# Patient Record
Sex: MALE | Race: WHITE | Employment: FULL TIME | URBAN - METROPOLITAN AREA
[De-identification: names, ages, dates, MRNs, and addresses within clinical notes are randomized per-mention and may not be internally consistent; named-entity substitution may affect disease eponyms.]

---

## 2020-02-11 ENCOUNTER — OFFICE VISIT (OUTPATIENT)
Dept: FAMILY MEDICINE CLINIC | Age: 40
End: 2020-02-11

## 2020-02-11 ENCOUNTER — HOSPITAL ENCOUNTER (OUTPATIENT)
Dept: LAB | Age: 40
Discharge: HOME | End: 2020-02-11
Payer: COMMERCIAL

## 2020-02-11 VITALS
DIASTOLIC BLOOD PRESSURE: 91 MMHG | RESPIRATION RATE: 16 BRPM | OXYGEN SATURATION: 96 % | HEART RATE: 95 BPM | SYSTOLIC BLOOD PRESSURE: 145 MMHG | WEIGHT: 209.8 LBS | TEMPERATURE: 98.4 F | HEIGHT: 72 IN | BODY MASS INDEX: 28.42 KG/M2

## 2020-02-11 DIAGNOSIS — G89.29 CHRONIC PAIN OF BOTH HIPS: ICD-10-CM

## 2020-02-11 DIAGNOSIS — M87.00 AVASCULAR NECROSIS (HCC): ICD-10-CM

## 2020-02-11 DIAGNOSIS — M10.00 ACUTE IDIOPATHIC GOUT, UNSPECIFIED SITE: ICD-10-CM

## 2020-02-11 DIAGNOSIS — N52.01 ERECTILE DYSFUNCTION DUE TO ARTERIAL INSUFFICIENCY: ICD-10-CM

## 2020-02-11 DIAGNOSIS — M25.551 CHRONIC PAIN OF BOTH HIPS: ICD-10-CM

## 2020-02-11 DIAGNOSIS — M25.552 CHRONIC PAIN OF BOTH HIPS: ICD-10-CM

## 2020-02-11 DIAGNOSIS — M10.00 ACUTE IDIOPATHIC GOUT, UNSPECIFIED SITE: Primary | ICD-10-CM

## 2020-02-11 LAB — URATE SERPL-MCNC: 7.5 MG/DL (ref 2.6–7.2)

## 2020-02-11 PROCEDURE — 36415 COLL VENOUS BLD VENIPUNCTURE: CPT

## 2020-02-11 PROCEDURE — 84550 ASSAY OF BLOOD/URIC ACID: CPT

## 2020-02-11 RX ORDER — SILDENAFIL 100 MG/1
100 TABLET, FILM COATED ORAL AS NEEDED
Qty: 30 TAB | Refills: 5 | Status: SHIPPED | OUTPATIENT
Start: 2020-02-11 | End: 2020-10-03

## 2020-02-11 RX ORDER — ALLOPURINOL 100 MG/1
100 TABLET ORAL DAILY
Qty: 90 TAB | Refills: 1 | Status: SHIPPED | OUTPATIENT
Start: 2020-02-11 | End: 2020-07-12

## 2020-02-11 RX ORDER — VENLAFAXINE HYDROCHLORIDE 150 MG/1
150 CAPSULE, EXTENDED RELEASE ORAL DAILY
COMMUNITY
Start: 2020-01-23

## 2020-02-11 RX ORDER — ALLOPURINOL 100 MG/1
TABLET ORAL DAILY
COMMUNITY
End: 2020-02-11 | Stop reason: SDUPTHER

## 2020-02-11 RX ORDER — SILDENAFIL 100 MG/1
100 TABLET, FILM COATED ORAL AS NEEDED
Qty: 9 TAB | Refills: 5 | Status: SHIPPED | OUTPATIENT
Start: 2020-02-11 | End: 2020-02-11 | Stop reason: SDUPTHER

## 2020-02-11 NOTE — PROGRESS NOTES
HISTORY OF PRESENT ILLNESS  Brennen Malave is a 44 y.o. male. Patient presents to [facility] care. HPI  Pt has avascular necrosis which was diagnosed 2015  He did not use steroids. Pt has questions regarding ED and treatment options. Pt is able to get an erection but not maintain it. Pt ran out of gout medication about a month ago. Review of Systems   Constitutional: Negative. Respiratory: Negative. Cardiovascular: Negative. Musculoskeletal: Positive for joint pain (esha hip). Visit Vitals  BP (!) 145/91 (BP 1 Location: Left arm)   Pulse 95   Temp 98.4 °F (36.9 °C) (Oral)   Resp 16   Ht 6' (1.829 m)   Wt 209 lb 12.8 oz (95.2 kg)   SpO2 96%   BMI 28.45 kg/m²       Physical Exam  Constitutional:       General: He is not in acute distress. Appearance: Normal appearance. He is not ill-appearing. Cardiovascular:      Rate and Rhythm: Normal rate and regular rhythm. Heart sounds: No murmur. Pulmonary:      Effort: Pulmonary effort is normal. No respiratory distress. Breath sounds: Normal breath sounds. No stridor. No wheezing, rhonchi or rales. Neurological:      Mental Status: He is alert and oriented to person, place, and time. ASSESSMENT and PLAN    ICD-10-CM ICD-9-CM    1. Acute idiopathic gout, unspecified site M10.00 274.01 allopurinoL (ZYLOPRIM) 100 mg tablet      URIC ACID   2. Erectile dysfunction due to arterial insufficiency N52.01 607.84 sildenafil citrate (VIAGRA) 100 mg tablet      DISCONTINUED: sildenafil citrate (VIAGRA) 100 mg tablet   3. Avascular necrosis ([address] M87.00 733.40 REFERRAL TO ORTHOPEDICS   4. Chronic pain of both hips M25.551 719.45     M25.552 338.29     G89.29       PLAN:  We discussed his history. Pt referred to ortho for his avascular necrosis. I have discussed the diagnosis with the patient and the intended plan as seen in the above orders.   The patient has received an after-visit summary and questions were answered concerning future plans.  I have discussed medication side effects and warnings with the patient as well. Patient will call for further questions. Follow-up and Dispositions    · Return in about 3 months (around 5/11/2020) for chronic care.

## 2020-02-11 NOTE — PROGRESS NOTES
Pt is here for establish care, discuss gout, possible ED    1. Have you been to the ER, urgent care clinic since your last visit? Hospitalized since your last visit? No    2. Have you seen or consulted any other health care providers outside of the [address] [place] [address] since your last visit? Include any pap smears or colon screening.  Yes Rica Du 10/19

## 2020-02-12 NOTE — PROGRESS NOTES
Please advise Pt that his gout level is elevated but since he has been out of his medication for a bit, I would like him to continue that dose. I would like to recheck this after he has been on the medication for several months.

## 2020-03-13 ENCOUNTER — OFFICE VISIT (OUTPATIENT)
Dept: ORTHOPEDIC SURGERY | Age: 40
End: 2020-03-13

## 2020-03-13 VITALS
HEIGHT: 72 IN | HEART RATE: 83 BPM | SYSTOLIC BLOOD PRESSURE: 145 MMHG | WEIGHT: 209 LBS | DIASTOLIC BLOOD PRESSURE: 90 MMHG | BODY MASS INDEX: 28.31 KG/M2 | OXYGEN SATURATION: 98 %

## 2020-03-13 DIAGNOSIS — M25.551 BILATERAL HIP PAIN: Primary | ICD-10-CM

## 2020-03-13 DIAGNOSIS — Z96.643 HISTORY OF BILATERAL HIP REPLACEMENTS: ICD-10-CM

## 2020-03-13 DIAGNOSIS — M25.552 BILATERAL HIP PAIN: Primary | ICD-10-CM

## 2020-03-13 NOTE — PROGRESS NOTES
Patient: Carter King               MRN: [id number]   SSN: [id number]  YOB: 1980       AGE: 44 y.o. SEX: male  Body mass index is Body mass index is 28.35 kg/m². PCP: Carrie Bonilla NP  03/13/20      HISTORY OF PRESENT ILLNESS:  I had the pleasure of viewing Mr. Shelly Walls in office today in regards to his bilateral hip replacements. 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 Patient has a history of DVT. He had both of his hips replaced 5 years ago, he had avascular necrosis. He reports that it took a while to recover from surgery, but once he got moving he was satisfied with the results of the surgery. He still has trouble doing lots of steps, he has to use the elevator at work. He reports an aching pain in the groin, left side is worse than the right. REVIEW OF SYSTEMS:  Twelve point review of systems performed today. Pertinent positives noted, all other systems reviewed negative. PHYSICAL EXAMINATION:  On examination today, patient walks well. Both hips rotate well and are not painful with rotation. Hip abductor and hip flexor strength are good. Calf is non-tender, both feet are warm and well perfused. No cyanosis, peripheral edema, or clubbing. RADIOGRAPHS:  AP pelvis and AP and lateral x-rays of both hips taken in our [facility] office today, 3/13/2020 reveal a [address] implant on the right hip that appears to be well fixed and well aligned, with relatively neutral anteversion. Left hip is also a summit that appears to be well fixed, the cup is appears to be somewhat lateralized and slightly closed. IMPRESSION:  Mr. Josi Velazquez hip replacements are doing okay, I do not think surgery is indicated. The cup in the left hip is slightly closed. I think he may be getting a mild impingement, which produces mild pain when he is sitting. I advised him to avoid sitting crossed-legged.  Again I do not think that an operation is necessary at this time, both implants are well osseous integrated and overall are doing very well for him. It is a pleasure to share in his care, we will see him back in follow up in two years, sooner if necessary. REVIEW OF SYSTEMS  Review of Systems   Constitutional: Negative. HENT: Negative. Eyes: Negative. Respiratory: Negative. Cardiovascular: Negative. Gastrointestinal: Negative. Genitourinary: Negative. Musculoskeletal: Positive for joint pain. Skin: Negative. Neurological: Negative. Endo/Heme/Allergies: Negative. Psychiatric/Behavioral: Negative. MEDICAL HISTORY  Past Medical History:   Diagnosis Date    Anxiety     Psychiatry Lois Mahoney Arthritis     Avascular necrosis ([facility] [address] [address]     Raymond Kim    [address] [place] [address] Depression     Psychiatry Lois Mahoney Gout     Hip pain, chronic     [facility] Dr. Jeaneth Kim     Tobacco abuse        SURGICAL HISTORY  Past Surgical History:   Procedure Laterality Date    HX HIP REPLACEMENT Left 07/22/2015    [facility] Dr. Vianey Teran Right 11/05/2015    [facility] Dr[facility] Jean[facility] Kim     HX WISDOM TEETH EXTRACTION         CURRENT MEDICATIONS  Current Outpatient Medications   Medication Sig Dispense Refill    venlafaxine-SR (EFFEXOR-XR) 150 mg capsule Take 150 mg by mouth daily.  pyridoxine HCl, vitamin B6, (VITAMIN B-6 PO) Take 500 mg by mouth daily.  allopurinoL (ZYLOPRIM) 100 mg tablet Take 1 Tab by mouth daily. 90 Tab 1    sildenafil citrate (VIAGRA) 100 mg tablet Take 1 Tab by mouth as needed for Erectile Dysfunction. 30 Tab 5    clonazePAM (KLONOPIN) 2 mg tablet Take 2 mg by mouth three (3) times daily. Indications: PANIC DISORDER, QTY #90, Written on 8/18/16, Filled on 10/7/16. Prescriber Psychiatry Eileen Renee         ALLERGIES  No Known Allergies    FAMILHY HISTORY  [id number]      SOCIAL HISTORY  Social History     Socioeconomic History    Marital status:      Spouse name: Not on file    Number of children: Not on file    Years of education: Not on file    Highest education level: Not on file   Tobacco Use    Smoking status: Current Every Day Smoker     Packs/day: 0.50     Years: 20.00     Pack years: 10.00     Types: Cigarettes    Smokeless tobacco: Never Used   Substance and Sexual Activity    Alcohol use: Yes     Comment: 1 case of beer a week    Drug use: No    Sexual activity: Yes     Partners: Female     Birth control/protection: None       VISIT VITALS  Visit Vitals  /90   Pulse 83   Ht 6' (1.829 m)   Wt 209 lb (94.8 kg)   SpO2 98%   BMI 28.35 kg/m²       Pain Assessment  3/13/2020   Location of Pain Hip   Location Modifiers Right;Left   Severity of Pain 0         Written by [employer] as dictated by Garrison Quigley MD.

## 2020-05-13 ENCOUNTER — VIRTUAL VISIT (OUTPATIENT)
Dept: FAMILY MEDICINE CLINIC | Age: 40
End: 2020-05-13

## 2020-05-13 DIAGNOSIS — R73.03 PREDIABETES: ICD-10-CM

## 2020-05-13 DIAGNOSIS — M10.00 ACUTE IDIOPATHIC GOUT, UNSPECIFIED SITE: Primary | ICD-10-CM

## 2020-05-13 DIAGNOSIS — Z12.5 SCREENING FOR PROSTATE CANCER: ICD-10-CM

## 2020-05-13 DIAGNOSIS — Z13.220 SCREENING FOR HYPERLIPIDEMIA: ICD-10-CM

## 2020-05-13 NOTE — PROGRESS NOTES
Marilynn Lozano is a 44 y.o. male who was seen by synchronous (real-time) audio-video technology on 5/13/2020. Consent: Marilynn Lozano, who was seen by synchronous (real-time) audio-video technology, and/or his healthcare decision maker, is aware that this patient-initiated, Telehealth encounter on 5/13/2020 is a billable service, with coverage as determined by his insurance carrier. He is aware that he may receive a bill and has provided verbal consent to proceed: Yes. I am working from home. Patient is in his car at work. Subjective:   Marilynn Lozano is a 44 y.o. male who was seen for gout    Pt states he is doing well with his gout since starting the medication. He would like to have his HgbA1C done since at one point someone told him he was prediabetic. Prior to Admission medications    Medication Sig Start Date End Date Taking? Authorizing Provider   venlafaxine-SR [facility]) 150 mg capsule Take 150 mg by mouth daily. 1/23/20   Provider, Historical   pyridoxine HCl, vitamin B6, (VITAMIN B-6 PO) Take 500 mg by mouth daily. Provider, Historical   allopurinoL (ZYLOPRIM) 100 mg tablet Take 1 Tab by mouth daily. 2/11/20   Mary Steve, ELIZABETH   sildenafil citrate (VIAGRA) 100 mg tablet Take 1 Tab by mouth as needed for Erectile Dysfunction. 2/11/20   Mary Steve, ELIZABETH   clonazePAM [place] [facility]) 2 mg tablet Take 2 mg by mouth three (3) times daily. Indications: PANIC DISORDER, QTY #90, Written on 8/18/16, Filled on 10/7/16. Prescriber Psychiatry [facility] 50 Jessica Szymanski NP     No Known Allergies    Patient Active Problem List    Diagnosis Date Noted    Depression     Anxiety     Hip pain, chronic     Avascular necrosis (HCC)     Tobacco abuse     Poison sumac 09/06/2011     Current Outpatient Medications   Medication Sig Dispense Refill    venlafaxine-SR (EFFEXOR-XR) 150 mg capsule Take 150 mg by mouth daily.       pyridoxine HCl, vitamin B6, (VITAMIN B-6 PO) Take 500 mg by mouth daily.  allopurinoL (ZYLOPRIM) 100 mg tablet Take 1 Tab by mouth daily. 90 Tab 1    sildenafil citrate (VIAGRA) 100 mg tablet Take 1 Tab by mouth as needed for Erectile Dysfunction. 30 Tab 5    clonazePAM (KLONOPIN) 2 mg tablet Take 2 mg by mouth three (3) times daily. Indications: PANIC DISORDER, QTY #90, Written on 8/18/16, Filled on 10/7/16. Prescriber Psychiatry Eileen Renee       No Known Allergies  Past Medical History:   Diagnosis Date    Anxiety     Psychiatry Lois Buckley Arthritis     Avascular necrosis ([address]     Nitish Raheel Dr. Tala Mejia.Peri Depression     Psychiatry Lois Buckley Gout     Hip pain, chronic     [facility] Dr. Tala Hollis     Tobacco abuse      Past Surgical History:   Procedure Laterality Date    HX HIP REPLACEMENT Left 07/22/2015    [facility] Dr[facility] Tala Hollis     HX HIP REPLACEMENT Right 11/05/2015    [facility] Dr[facility] Tala Hollis     HX WISDOM TEETH EXTRACTION       Family History   Problem Relation Age of Onset    Anxiety Mother     Heart Attack Father     Diabetes Brother     Heart Disease Paternal Grandmother     Diabetes Paternal Grandmother     Heart Disease Paternal Grandfather      Social History     Tobacco Use    Smoking status: Current Every Day Smoker     Packs/day: 0.50     Years: 20.00     Pack years: 10.00     Types: Cigarettes    Smokeless tobacco: Never Used   Substance Use Topics    Alcohol use: Yes     Comment: 1 case of beer a week       Review of Systems   Constitutional: Negative. Respiratory: Negative. Cardiovascular: Negative. Genitourinary: Negative. Objective:   Vital Signs: (As obtained by patient/caregiver at home)  There were no vitals taken for this visit.      [INSTRUCTIONS:  \"[x]\" Indicates a positive item  \"[]\" Indicates a negative item  -- DELETE ALL ITEMS NOT EXAMINED]    Constitutional: [x] Appears well-developed and well-nourished [x] No apparent distress        Mental status: [x] Alert and awake  [x] Oriented to person/place/time [x] Able to follow commands        Eyes:   EOM    [x]  Normal      Sclera  [x]  Normal              Discharge [x]  None visible       HENT: [x] Normocephalic, atraumatic        Pulmonary/Chest: [x] Respiratory effort normal   [x] No visualized signs of difficulty breathing or respiratory distress                  [x] Normal range of motion of neck      Neurological:        [x] No Facial Asymmetry (Cranial nerve 7 motor function) (limited exam due to video visit)       -            Psychiatric:       [x] Normal Affect     Diagnoses and all orders for this visit:    Acute idiopathic gout, unspecified site  -     URIC ACID; Future    Screening for prostate cancer  -     PSA SCREENING (SCREENING); Future    Prediabetes  -     METABOLIC PANEL, COMPREHENSIVE; Future  -     HEMOGLOBIN A1C WITH EAG; Future    Screening for hyperlipidemia  -     LIPID PANEL; Future      PLAN:  Pt will call for refills when he needs them. Pt will continue to work on his diet and exercise. I have discussed the diagnosis with the patient and the intended plan as seen in the above orders. The patient has received an after-visit summary and questions were answered concerning future plans. I have discussed medication side effects and warnings with the patient as well. Patient will call for further questions. Follow-up and Dispositions    · Return in about 6 months (around 11/13/2020) for chronic care. We discussed the expected course, resolution and complications of the diagnosis(es) in detail. Medication risks, benefits, costs, interactions, and alternatives were discussed as indicated. I advised him to contact the office if his condition worsens, changes or fails to improve as anticipated. He expressed understanding with the diagnosis(es) and plan.        Cris French is a 44 y.o. male who was evaluated by a video visit encounter for concerns as above. Patient identification was verified prior to start of the visit. A caregiver was present when appropriate. Due to this being a TeleHealth encounter (During UJQNX-86 public health emergency), evaluation of the following organ systems was limited: Vitals/Constitutional/EENT/Resp/CV/GI//MS/Neuro/Skin/Heme-Lymph-Imm. Pursuant to the emergency declaration under the [facility], [facility] waiver authority and the [employer] and [employer] Act, this Virtual  Visit was conducted, with patient's (and/or legal guardian's) consent, to reduce the patient's risk of exposure to COVID-19 and provide necessary medical care. Services were provided through a video synchronous discussion virtually to substitute for in-person clinic visit. Patient and provider were located at their individual homes.       Kory Garcia NP

## 2020-05-27 ENCOUNTER — HOSPITAL ENCOUNTER (OUTPATIENT)
Dept: LAB | Age: 40
Discharge: HOME | End: 2020-05-27
Payer: COMMERCIAL

## 2020-05-27 DIAGNOSIS — Z12.5 SCREENING FOR PROSTATE CANCER: ICD-10-CM

## 2020-05-27 DIAGNOSIS — M10.00 ACUTE IDIOPATHIC GOUT, UNSPECIFIED SITE: ICD-10-CM

## 2020-05-27 DIAGNOSIS — Z13.220 SCREENING FOR HYPERLIPIDEMIA: ICD-10-CM

## 2020-05-27 DIAGNOSIS — R73.03 PREDIABETES: ICD-10-CM

## 2020-05-27 LAB
ALBUMIN SERPL-MCNC: 4 G/DL (ref 3.4–5)
ALBUMIN/GLOB SERPL: 1.1 {RATIO} (ref 0.8–1.7)
ALP SERPL-CCNC: 92 U/L (ref 45–117)
ALT SERPL-CCNC: 41 U/L (ref 16–61)
ANION GAP SERPL CALC-SCNC: 4 MMOL/L (ref 3–18)
AST SERPL-CCNC: 30 U/L (ref 10–38)
BILIRUB SERPL-MCNC: 0.5 MG/DL (ref 0.2–1)
BUN SERPL-MCNC: 15 MG/DL (ref 7–18)
BUN/CREAT SERPL: 18 (ref 12–20)
CALCIUM SERPL-MCNC: 9.1 MG/DL (ref 8.5–10.1)
CHLORIDE SERPL-SCNC: 107 MMOL/L (ref 100–111)
CHOLEST SERPL-MCNC: 205 MG/DL
CO2 SERPL-SCNC: 26 MMOL/L (ref 21–32)
CREAT SERPL-MCNC: 0.84 MG/DL (ref 0.6–1.3)
EST. AVERAGE GLUCOSE BLD GHB EST-MCNC: 111 MG/DL
GLOBULIN SER CALC-MCNC: 3.7 G/DL (ref 2–4)
GLUCOSE SERPL-MCNC: 80 MG/DL (ref 74–99)
HBA1C MFR BLD: 5.5 % (ref 4.2–5.6)
HDLC SERPL-MCNC: 62 MG/DL (ref 40–60)
HDLC SERPL: 3.3 {RATIO} (ref 0–5)
LDLC SERPL CALC-MCNC: 120.2 MG/DL (ref 0–100)
LIPID PROFILE,FLP: ABNORMAL
POTASSIUM SERPL-SCNC: 4.2 MMOL/L (ref 3.5–5.5)
PROT SERPL-MCNC: 7.7 G/DL (ref 6.4–8.2)
PSA SERPL-MCNC: 1.7 NG/ML (ref 0–4)
SODIUM SERPL-SCNC: 137 MMOL/L (ref 136–145)
TRIGL SERPL-MCNC: 114 MG/DL (ref ?–150)
URATE SERPL-MCNC: 5.5 MG/DL (ref 2.6–7.2)
VLDLC SERPL CALC-MCNC: 22.8 MG/DL

## 2020-05-27 PROCEDURE — 80061 LIPID PANEL: CPT

## 2020-05-27 PROCEDURE — 80053 COMPREHEN METABOLIC PANEL: CPT

## 2020-05-27 PROCEDURE — 84153 ASSAY OF PSA TOTAL: CPT

## 2020-05-27 PROCEDURE — 84550 ASSAY OF BLOOD/URIC ACID: CPT

## 2020-05-27 PROCEDURE — 83036 HEMOGLOBIN GLYCOSYLATED A1C: CPT

## 2020-05-27 PROCEDURE — 36415 COLL VENOUS BLD VENIPUNCTURE: CPT

## 2020-08-26 ENCOUNTER — HOSPITAL ENCOUNTER (OUTPATIENT)
Dept: LAB | Age: 40
Discharge: HOME | End: 2020-08-26
Payer: COMMERCIAL

## 2020-08-26 ENCOUNTER — VIRTUAL VISIT (OUTPATIENT)
Dept: FAMILY MEDICINE CLINIC | Age: 40
End: 2020-08-26

## 2020-08-26 DIAGNOSIS — M25.562 ACUTE PAIN OF LEFT KNEE: ICD-10-CM

## 2020-08-26 DIAGNOSIS — M10.00 ACUTE IDIOPATHIC GOUT, UNSPECIFIED SITE: ICD-10-CM

## 2020-08-26 DIAGNOSIS — M25.562 ACUTE PAIN OF LEFT KNEE: Primary | ICD-10-CM

## 2020-08-26 LAB — URATE SERPL-MCNC: 3.2 MG/DL (ref 2.6–7.2)

## 2020-08-26 PROCEDURE — 36415 COLL VENOUS BLD VENIPUNCTURE: CPT

## 2020-08-26 PROCEDURE — 84550 ASSAY OF BLOOD/URIC ACID: CPT

## 2020-08-26 RX ORDER — PREDNISONE 10 MG/1
TABLET ORAL
Qty: 33 TAB | Refills: 0 | Status: SHIPPED | OUTPATIENT
Start: 2020-08-26 | End: 2020-08-26 | Stop reason: SDUPTHER

## 2020-08-26 RX ORDER — PREDNISONE 10 MG/1
TABLET ORAL
Qty: 33 TAB | Refills: 0 | Status: SHIPPED | OUTPATIENT
Start: 2020-08-26 | End: 2020-12-23 | Stop reason: ALTCHOICE

## 2020-08-26 RX ORDER — HYDROCODONE BITARTRATE AND ACETAMINOPHEN 5; 300 MG/1; MG/1
1 TABLET ORAL
Qty: 30 TAB | Refills: 0 | Status: SHIPPED | OUTPATIENT
Start: 2020-08-26 | End: 2020-08-31

## 2020-08-26 NOTE — PROGRESS NOTES
Donald Lundberg is a 36 y.o. male who was seen by synchronous (real-time) audio-video technology on 8/26/2020 for left knee pain    I am working from home. Patient is at his work      Subjective:   Pt woke up on 8- with left knee pain. He denies injury. By Tuesday 8-, it hurt so bad that he went to Urgent Care. He was given Etolac and had x-rays which were normal. The NSAID has not helped at all. It does not feel like his gout that he gets in his toe. Prior to Admission medications    Medication Sig Start Date End Date Taking? Authorizing Provider   allopurinoL (ZYLOPRIM) 100 mg tablet TAKE ONE TABLET BY MOUTH DAILY 7/12/20   Honey Steve NP   venlafaxine-SR [facility]) 150 mg capsule Take 150 mg by mouth daily. 1/23/20   Provider, Historical   pyridoxine HCl, vitamin B6, (VITAMIN B-6 PO) Take 500 mg by mouth daily. Provider, Historical   sildenafil citrate (VIAGRA) 100 mg tablet Take 1 Tab by mouth as needed for Erectile Dysfunction. 2/11/20   Honey Steve NP   clonazePAM [place]) 2 mg tablet Take 2 mg by mouth three (3) times daily. Indications: PANIC DISORDER, QTY #90, Written on 8/18/16, Filled on 10/7/16. Prescriber Psychiatry Eileen 50 Alli Lewis NP     Patient Active Problem List    Diagnosis Date Noted    Depression     Anxiety     Hip pain, chronic     Avascular necrosis (HCC)     Tobacco abuse     Poison sumac 09/06/2011     Current Outpatient Medications   Medication Sig Dispense Refill    predniSONE (DELTASONE) 10 mg tablet Day 1: 6 tablets Day 2: 5 tablets Day 3: 4 tablets Days 4,5,6: 3 tablets Days 7,8,9: 2 tablets Days 10,11,12: 1 tablet. 33 Tab 0    HYDROcodone-acetaminophen (XODOL) 5-300 mg tablet Take 1 Tab by mouth every four (4) hours as needed for Pain for up to 5 days. Max Daily Amount: 6 Tabs.  30 Tab 0    allopurinoL (ZYLOPRIM) 100 mg tablet TAKE ONE TABLET BY MOUTH DAILY 90 Tab 1    venlafaxine-SR (EFFEXOR-XR) 150 mg capsule Take 150 mg by mouth daily.  pyridoxine HCl, vitamin B6, (VITAMIN B-6 PO) Take 500 mg by mouth daily.  sildenafil citrate (VIAGRA) 100 mg tablet Take 1 Tab by mouth as needed for Erectile Dysfunction. 30 Tab 5    clonazePAM (KLONOPIN) 2 mg tablet Take 2 mg by mouth three (3) times daily. Indications: PANIC DISORDER, QTY #90, Written on 8/18/16, Filled on 10/7/16. Prescriber Psychiatry Eileen Renee       No Known Allergies  Past Medical History:   Diagnosis Date    Anxiety     Psychiatry Lois Estrella Maggi Arthritis     [address] necrosis [address] [address]     Nela Sammiehayden Henning    [place] Depression     Psychiatry Lois Tay Gout     Hip pain, chronic     [facility] Dr[facility] Suha Henning     Tobacco abuse      Past Surgical History:   Procedure Laterality Date    HX HIP REPLACEMENT Left 07/22/2015    [facility] Su[facility] Henning     HX HIP REPLACEMENT Right 11/05/2015    [facility] Su[facility] Henning     HX WISDOM TEETH EXTRACTION       Family History   Problem Relation Age of Onset    Anxiety Mother     Heart Attack Father     Diabetes Brother     Heart Disease Paternal Grandmother     Diabetes Paternal Grandmother     Heart Disease Paternal Grandfather      Social History     Tobacco Use    Smoking status: Current Every Day Smoker     Packs/day: 0.50     Years: 20.00     Pack years: 10.00     Types: Cigarettes    Smokeless tobacco: Never Used   Substance Use Topics    Alcohol use: Yes     Comment: 1 case of beer a week       Review of Systems   Constitutional: Negative. HENT: Negative. Respiratory: Negative. Cardiovascular: Negative. Musculoskeletal: Positive for joint pain (left knee pain). Objective:   No flowsheet data found.      [INSTRUCTIONS:  \"[x]\" Indicates a positive item  \"[]\" Indicates a negative item  -- DELETE ALL ITEMS NOT EXAMINED]    Constitutional: [x] Appears well-developed and well-nourished [x] No apparent distress          Mental status: [x] Alert and awake  [x] Oriented to person/place/time [x] Able to follow commands         Eyes:   EOM    [x]  Normal      Sclera  [x]  Normal              Discharge [x]  None visible        HENT: [x] Normocephalic, atraumatic        Pulmonary/Chest: [x] Respiratory effort normal   [x] No visualized signs of difficulty breathing or respiratory distress          Musculoskeletal:           [x] Normal range of motion of neck            Neurological:        [x] No Facial Asymmetry (Cranial nerve 7 motor function) (limited exam due to video visit)          [x] No gaze palsy                    Psychiatric:       [x] Normal Affect        [x] No Hallucinations    Diagnoses and all orders for this visit:    Acute pain of left knee  -     predniSONE (DELTASONE) 10 mg tablet; Day 1: 6 tablets Day 2: 5 tablets Day 3: 4 tablets Days 4,5,6: 3 tablets Days 7,8,9: 2 tablets Days 10,11,12: 1 tablet., Normal, Disp-33 Tab,R-0  -     HYDROcodone-acetaminophen (XODOL) 5-300 mg tablet; Take 1 Tab by mouth every four (4) hours as needed for Pain for up to 5 days. Max Daily Amount: 6 Tabs., Normal, Disp-30 Tab,R-0  -     URIC ACID; Future    Acute idiopathic gout, unspecified site  -     URIC ACID; Future      PLAN:  If uric acid levels are normal, the next step is a MRI. Pt was advised to take the Vicodin only at night. Pt advised to take Prednisone with food and in the am.    I have discussed the diagnosis with the patient and the intended plan as seen in the above orders. The patient has received an after-visit summary and questions were answered concerning future plans. I have discussed medication side effects and warnings with the patient as well. Patient will call for further questions. Follow-up and Dispositions    · Return if symptoms worsen or fail to improve. We discussed the expected course, resolution and complications of the diagnosis(es) in detail.   Medication risks, benefits, costs, interactions, and alternatives were discussed as indicated. I advised him to contact the office if his condition worsens, changes or fails to improve as anticipated. He expressed understanding with the diagnosis(es) and plan. Sally Hackett, who was evaluated through a patient-initiated, synchronous (real-time) audio-video encounter, and/or his healthcare decision maker, is aware that it is a billable service, with coverage as determined by his insurance carrier. He provided verbal consent to proceed: Yes, and patient identification was verified. It was conducted pursuant to the emergency declaration under the [facility], [address] [place] [address] authority and the [employer] and [employer] Act. A caregiver was present when appropriate. Ability to conduct physical exam was limited. I was at home. The patient was at home.       Aniket Edmonds NP

## 2020-08-28 DIAGNOSIS — M25.562 ACUTE PAIN OF LEFT KNEE: Primary | ICD-10-CM

## 2020-09-08 ENCOUNTER — HOSPITAL ENCOUNTER (OUTPATIENT)
Dept: MRI IMAGING | Age: 40
Discharge: HOME | End: 2020-09-08
Attending: NURSE PRACTITIONER
Payer: COMMERCIAL

## 2020-09-08 DIAGNOSIS — M25.562 ACUTE PAIN OF LEFT KNEE: ICD-10-CM

## 2020-09-08 DIAGNOSIS — R93.89 ABNORMAL FINDING OF DIAGNOSTIC IMAGING: ICD-10-CM

## 2020-09-08 DIAGNOSIS — M25.562 ACUTE PAIN OF LEFT KNEE: Primary | ICD-10-CM

## 2020-09-08 PROCEDURE — 73721 MRI JNT OF LWR EXTRE W/O DYE: CPT

## 2020-09-08 NOTE — PROGRESS NOTES
Please advise Patient that the MRI showed a stress fracture proximal medial tibia. I place a referral to ortho for him.

## 2020-09-09 ENCOUNTER — OFFICE VISIT (OUTPATIENT)
Dept: ORTHOPEDIC SURGERY | Age: 40
End: 2020-09-09

## 2020-09-09 ENCOUNTER — VIRTUAL VISIT (OUTPATIENT)
Dept: FAMILY MEDICINE CLINIC | Age: 40
End: 2020-09-09

## 2020-09-09 VITALS
SYSTOLIC BLOOD PRESSURE: 146 MMHG | DIASTOLIC BLOOD PRESSURE: 99 MMHG | HEART RATE: 94 BPM | BODY MASS INDEX: 29.57 KG/M2 | TEMPERATURE: 96.8 F | WEIGHT: 218 LBS

## 2020-09-09 DIAGNOSIS — M84.362A STRESS FRACTURE OF LEFT TIBIA, INITIAL ENCOUNTER: Primary | ICD-10-CM

## 2020-09-09 DIAGNOSIS — R93.89 ABNORMAL FINDING ON IMAGING: ICD-10-CM

## 2020-09-09 DIAGNOSIS — M25.562 ACUTE PAIN OF LEFT KNEE: Primary | ICD-10-CM

## 2020-09-09 RX ORDER — TRAMADOL HYDROCHLORIDE 50 MG/1
50 TABLET ORAL
Qty: 60 TAB | Refills: 0 | Status: SHIPPED | OUTPATIENT
Start: 2020-09-09 | End: 2020-10-09

## 2020-09-09 RX ORDER — ALENDRONATE SODIUM 70 MG/1
70 TABLET ORAL
Qty: 4 TAB | Refills: 0 | Status: SHIPPED | OUTPATIENT
Start: 2020-09-09 | End: 2020-10-08 | Stop reason: SDUPTHER

## 2020-09-09 NOTE — PROGRESS NOTES
Amber Dash is a 36 y.o. male who was seen by synchronous (real-time) audio-video technology on 9/9/2020 for follow up MRI    I am working from home. Patient is in his car. Subjective:     MRI 9-8-2020  1. Findings consistent with stress fracture, proximal medial tibial condyle,  with surrounding local contusional marrow and minimal juxta osseous soft tissue  edema. No gross arthritis. Additional small adjacent proximal tibial  intramedullary bone infarct. Top normal joint fluid volume and minimal popliteal  cyst.     Patient states he is in more pain when he walks. He denies injury. 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 This is the only thing that is new for him. Prior to Admission medications    Medication Sig Start Date End Date Taking? Authorizing Provider   traMADoL (ULTRAM) 50 mg tablet Take 1 Tab by mouth every six (6) hours as needed for Pain for up to 30 days. Max Daily Amount: 200 mg. 9/9/20 10/9/20 Yes Paz Steve NP   predniSONE (DELTASONE) 10 mg tablet Day 1: 6 tablets Day 2: 5 tablets Day 3: 4 tablets Days 4,5,6: 3 tablets Days 7,8,9: 2 tablets Days 10,11,12: 1 tablet. 8/26/20   Paz Steve NP   allopurinoL (ZYLOPRIM) 100 mg tablet TAKE ONE TABLET BY MOUTH DAILY 7/12/20   Paz Steve NP   venlafaxine-SR [facility]) 150 mg capsule Take 150 mg by mouth daily. 1/23/20   Provider, Historical   pyridoxine HCl, vitamin B6, (VITAMIN B-6 PO) Take 500 mg by mouth daily. Provider, Historical   sildenafil citrate (VIAGRA) 100 mg tablet Take 1 Tab by mouth as needed for Erectile Dysfunction. 2/11/20   Paz Steve NP   clonazePAM Earlyne Jordana) 2 mg tablet Take 2 mg by mouth three (3) times daily. Indications: PANIC DISORDER, QTY #90, Written on 8/18/16, Filled on 10/7/16. Prescriber Psychiatry Eileen Oneal NP     Patient Active Problem List    Diagnosis Date Noted    Depression     Anxiety     Hip pain, chronic     Avascular necrosis (HCC)     Tobacco abuse     Poison sumac 09/06/2011     Current Outpatient Medications   Medication Sig Dispense Refill    traMADoL (ULTRAM) 50 mg tablet Take 1 Tab by mouth every six (6) hours as needed for Pain for up to 30 days. Max Daily Amount: 200 mg. 60 Tab 0    predniSONE (DELTASONE) 10 mg tablet Day 1: 6 tablets Day 2: 5 tablets Day 3: 4 tablets Days 4,5,6: 3 tablets Days 7,8,9: 2 tablets Days 10,11,12: 1 tablet. 33 Tab 0    allopurinoL (ZYLOPRIM) 100 mg tablet TAKE ONE TABLET BY MOUTH DAILY 90 Tab 1    venlafaxine-SR (EFFEXOR-XR) 150 mg capsule Take 150 mg by mouth daily.  pyridoxine HCl, vitamin B6, (VITAMIN B-6 PO) Take 500 mg by mouth daily.  sildenafil citrate (VIAGRA) 100 mg tablet Take 1 Tab by mouth as needed for Erectile Dysfunction. 30 Tab 5    clonazePAM (KLONOPIN) 2 mg tablet Take 2 mg by mouth three (3) times daily. Indications: PANIC DISORDER, QTY #90, Written on 8/18/16, Filled on 10/7/16. Prescriber Psychiatry Eileen Renee       No Known Allergies  Past Medical History:   Diagnosis Date    Anxiety     Psychiatry Lois Carcamo Leather Arthritis     Avascular necrosis ([facility] [address]     Prachi Lopez    [facility] Depression     Psychiatry Lois Carcamo Leather Gout     Hip pain, chronic     [facility] Dr. Gail Lopez     Tobacco abuse      Past Surgical History:   Procedure Laterality Date    HX HIP REPLACEMENT Left 07/22/2015    [facility] Dr. Herve Trevizo Right 11/05/2015    [facility] Dr. Gail Lopez     HX WISDOM TEETH EXTRACTION       Family History   Problem Relation Age of Onset    Anxiety Mother     Heart Attack Father     Diabetes Brother     Heart Disease Paternal Grandmother     Diabetes Paternal Grandmother     Heart Disease Paternal Grandfather      Social History     Tobacco Use    Smoking status: Current Every Day Smoker     Packs/day: 0.50     Years: 20.00     Pack years: 10.00     Types: Cigarettes    Smokeless tobacco: Never Used   Substance Use Topics    Alcohol use: Yes     Comment: 1 case of beer a week       Review of Systems   Constitutional: Negative. Cardiovascular: Negative. Musculoskeletal: Positive for joint pain (left knee pain. Hurts worse with walking.). Objective:   No flowsheet data found. [INSTRUCTIONS:  \"[x]\" Indicates a positive item  \"[]\" Indicates a negative item  -- DELETE ALL ITEMS NOT EXAMINED]    Constitutional: [x] Appears well-developed and well-nourished [x] No apparent distress      Mental status: [x] Alert and awake  [x] Oriented to person/place/time [x] Able to follow commands        Eyes:   EOM    [x]  Normal       Sclera  [x]  Normal              Discharge [x]  None visible       HENT: [x] Normocephalic, atraumatic        Pulmonary/Chest: [x] Respiratory effort normal   [x] No visualized signs of difficulty breathing or respiratory distress              Musculoskeletal:           [x] Normal range of motion of neck         Neurological:        [x] No Facial Asymmetry (Cranial nerve 7 motor function) (limited exam due to video visit)          [x] No gaze palsy               Psychiatric:       [x] Normal Affect        [x] No Hallucinations    Diagnoses and all orders for this visit:    Acute pain of left knee  -     traMADoL (ULTRAM) 50 mg tablet; Take 1 Tab by mouth every six (6) hours as needed for Pain for up to 30 days. Max Daily Amount: 200 mg., Normal, Disp-60 Tab,R-0    Abnormal finding on imaging      PLAN:  We discussed the findings. A note for work was written for light duty and no heavy lifting. We discussed pain medication, treatment options. I have discussed the diagnosis with the patient and the intended plan as seen in the above orders. The patient has received an after-visit summary and questions were answered concerning future plans. I have discussed medication side effects and warnings with the patient as well.  Patient will call for further questions. Follow-up and Dispositions    · Return if symptoms worsen or fail to improve. We discussed the expected course, resolution and complications of the diagnosis(es) in detail. Medication risks, benefits, costs, interactions, and alternatives were discussed as indicated. I advised him to contact the office if his condition worsens, changes or fails to improve as anticipated. He expressed understanding with the diagnosis(es) and plan. Donald Lundberg, who was evaluated through a patient-initiated, synchronous (real-time) audio-video encounter, and/or his healthcare decision maker, is aware that it is a billable service, with coverage as determined by his insurance carrier. He provided verbal consent to proceed: Yes, and patient identification was verified. It was conducted pursuant to the emergency declaration under the [address] [place] [address], [address] [place] [address] authority and the [employer] General Act. A caregiver was present when appropriate. Ability to conduct physical exam was limited. I was at home. The patient was at home.       Blanche Barger NP

## 2020-09-09 NOTE — LETTER
NOTIFICATION RETURN TO WORK / SCHOOL 
 
9/9/2020 4:11 PM 
 
Mr. Burak Ren 
[phone] [address] [address] To Whom It May Concern: 
 
Burak Ren is currently under the care of [address] [place] [address] Jamison Weber. He can only be doing sitting work while at work. If there are questions or concerns please have the patient contact our office. Sincerely, David Mcallister MD

## 2020-09-09 NOTE — PROGRESS NOTES
Rohan Denise  1980   Chief Complaint   Patient presents with    Leg Pain     left        HISTORY OF PRESENT ILLNESS  Rohan Denise is a 36 y.o. male who presents today for evaluation of left leg pain. He rates his pain 9/10 today. Pain has been present since 8/17/2020. 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 Pt went to Urgent Care, was given Lodine. Has tried wearing a knee brace. Pt states he could not walk on his leg after the onset of pain. Patient describes the pain as throbbing that is Constant in nature. Symptoms are worse with stairs, walking, standing, Activity and is better with  Rest. Associated symptoms include nothing. Since problem started, it: has worsened slightly. Pain does not wake patient up at night. Has taken Lodine for the problem. Has tried following treatments: Injections:NO; Brace:YES; Therapy:NO; Cane/Crutch:NO       No Known Allergies     Past Medical History:   Diagnosis Date    Anxiety     Psychiatry Lois Fernandez Arthritis     Avascular necrosis ([facility] [address]     Kris Mcgee    [address] [place] [address] Depression     Psychiatry Lois Fernandez Gout     Hip pain, chronic     [facility] Dr. Charles Mcgee     Tobacco abuse       Social History     Socioeconomic History    Marital status:      Spouse name: Not on file    Number of children: Not on file    Years of education: Not on file    Highest education level: Not on file   Occupational History    Not on file   Social Needs    Financial resource strain: Not on file    Food insecurity     Worry: Not on file     Inability: Not on file    Transportation needs     Medical: Not on file     Non-medical: Not on file   Tobacco Use    Smoking status: Current Every Day Smoker     Packs/day: 0.50     Years: 20.00     Pack years: 10.00 Types: Cigarettes    Smokeless tobacco: Never Used   Substance and Sexual Activity    Alcohol use: Yes     Comment: 1 case of beer a week    Drug use: No    Sexual activity: Yes     Partners: Female     Birth control/protection: None   Lifestyle    Physical activity     Days per week: Not on file     Minutes per session: Not on file    Stress: Not on file   Relationships    Social connections     Talks on phone: Not on file     Gets together: Not on file     Attends Orthodoxy service: Not on file     Active member of club or organization: Not on file     Attends meetings of clubs or organizations: Not on file     Relationship status: Not on file    Intimate partner violence     Fear of current or ex partner: Not on file     Emotionally abused: Not on file     Physically abused: Not on file     Forced sexual activity: Not on file   Other Topics Concern    Not on file   Social History Narrative    Not on file      Past Surgical History:   Procedure Laterality Date    Len HIP REPLACEMENT Left 07/22/2015    Frieda Burnette Right 11/05/2015    Frieda Carver Aas History   Problem Relation Age of Onset    Anxiety Mother     Heart Attack Father     Diabetes Brother     Heart Disease Paternal Grandmother     Diabetes Paternal Grandmother     Heart Disease Paternal Grandfather       Current Outpatient Medications   Medication Sig    allopurinoL (ZYLOPRIM) 100 mg tablet TAKE ONE TABLET BY MOUTH DAILY    venlafaxine-SR (EFFEXOR-XR) 150 mg capsule Take 150 mg by mouth daily.  pyridoxine HCl, vitamin B6, (VITAMIN B-6 PO) Take 500 mg by mouth daily.  sildenafil citrate (VIAGRA) 100 mg tablet Take 1 Tab by mouth as needed for Erectile Dysfunction.  clonazePAM (KLONOPIN) 2 mg tablet Take 2 mg by mouth three (3) times daily. Indications: PANIC DISORDER, QTY #90, Written on 8/18/16, Filled on 10/7/16.  Prescriber Psychiatry Eileen Renee    traMADoL (ULTRAM) 50 mg tablet Take 1 Tab by mouth every six (6) hours as needed for Pain for up to 30 days. Max Daily Amount: 200 mg.  predniSONE (DELTASONE) 10 mg tablet Day 1: 6 tablets Day 2: 5 tablets Day 3: 4 tablets Days 4,5,6: 3 tablets Days 7,8,9: 2 tablets Days 10,11,12: 1 tablet. No current facility-administered medications for this visit. REVIEW OF SYSTEM   Patient denies: Weight loss, Fever/Chills, HA, Visual changes, Fatigue, Chest pain, SOB, Abdominal pain, N/V/D/C, Blood in stool or urine, Edema. Pertinent positive as above in HPI. All others were negative    PHYSICAL EXAM:   Visit Vitals  BP (!) 146/99   Pulse 94   Temp 96.8 °F (36 °C)   Wt 218 lb (98.9 kg)   BMI 29.57 kg/m²     The patient is a well-developed, well-nourished male   in no acute distress. The patient is alert and oriented times three. The patient is alert and oriented times three. Mood and affect are normal.  LYMPHATIC: lymph nodes are not enlarged and are within normal limits  SKIN: normal in color and non tender to palpation. There are no bruises or abrasions noted. NEUROLOGICAL: Motor sensory exam is within normal limits. Reflexes are equal bilaterally. There is normal sensation to pinprick and light touch  MUSCULOSKELETAL:   Examination Left knee   Skin Intact   Range of motion 0-130   Effusion +   Medial joint line tenderness +   Lateral joint line tenderness -   Tenderness Pes Bursa -   Tenderness insertion MCL -   Tenderness insertion LCL -   Sapphires -   Patella crepitus -   Patella grind -   Lachman -   Pivot shift -   Anterior drawer -   Posterior drawer -   Varus stress -   Valgus stress -   Neurovascular Intact   Calf Swelling and Tenderness to Palpation -   Saman's Test -   Hamstring Cord Tightness -       IMAGING: MRI of left knee dated 9/08/2020 was reviewed and read by Dr. Clementine Flaherty:   IMPRESSION:  1.  Findings consistent with stress fracture, proximal medial tibial condyle, with surrounding local contusional marrow and minimal juxta osseous soft tissue edema. No gross arthritis. Additional small adjacent proximal tibial  intramedullary bone infarct. Top normal joint fluid volume and minimal popliteal cyst.  2. Intact menisci. 3. Intact cruciate ligaments. 4. Unremarkable anterior compartment. Superficial anteromedial knee susceptibility artifact; no dedicated knee plain films, but no apparent abnormality on prior femur series, 2016; finding is probably from cutaneous micrometal      IMPRESSION:      ICD-10-CM ICD-9-CM    1. Stress fracture of left tibia, initial encounter  M84.362A 733.93 AMB SUPPLY ORDER      AMB SUPPLY ORDER      alendronate (Fosamax) 70 mg tablet        PLAN:  1. Pt presents today with left leg pain due to an MRI-documented stress fracture of the tibia. The patient started having left leg pain after experiencing an increased demand from his job which includes increased standing, lifting, and climbing while working on carriers at the [employer]. Pt was given crutches and an order for an MCL brace today. Was also given a prescription for Fosamax today and we will see him back in 2 weeks for a repeat XR. Was given a work note stating he can only be doing sitting work. Risk factors include: n/a   2. No ultrasound exam indicated today  3. No cortisone injection indicated today   4. No Physical/Occupational Therapy indicated today  5. No diagnostic test indicated today:   6. Yes durable medical equipment indicated today CRUTCHES & MCL BRACE  7. No referral indicated today   8. Yes medications indicated today: FOSAMAX  9. No Narcotic indicated today     RTC 2 weeks for repeat XR      Scribed by [address]) as dictated by Sima Johnson MD    I, Dr. Sima Johnson, confirm that all documentation is accurate.     Sima Johnson M.D.   [facility]

## 2020-09-09 NOTE — LETTER
9/9/2020 8:43 AM 
 
Mr. Cliff Mabry 
[address] [id number] [address] [address] To Whom It May Concern: 
 
lCiff Mabry is currently under the care of [address] He was seen today for follow up. Patient has been advised light duty and no heavy lifting or carrying greater than 10 lbs until further notice. Patient has been referred to a specialist. 
 
If there are questions or concerns please have the patient contact our office. Sincerely, Evangelina Barnhart NP

## 2020-09-22 ENCOUNTER — TELEPHONE (OUTPATIENT)
Dept: ORTHOPEDIC SURGERY | Age: 40
End: 2020-09-22

## 2020-09-22 NOTE — TELEPHONE ENCOUNTER
[phone] until 4:30 pm   [phone] after 4:30    Patient is asking if provider recalls the two locations he could obtain the MCL brace. He says that he went and got measured, but cannot locate name of facility or number. Please advise.

## 2020-09-23 NOTE — TELEPHONE ENCOUNTER
Left message to return call. If patient calls back please inform him that the brace company is called [facility], the phone number is [phone].

## 2020-09-24 ENCOUNTER — TELEPHONE (OUTPATIENT)
Dept: ORTHOPEDIC SURGERY | Age: 40
End: 2020-09-24

## 2020-09-29 ENCOUNTER — OFFICE VISIT (OUTPATIENT)
Dept: ORTHOPEDIC SURGERY | Age: 40
End: 2020-09-29
Payer: COMMERCIAL

## 2020-09-29 ENCOUNTER — PATIENT MESSAGE (OUTPATIENT)
Dept: ORTHOPEDIC SURGERY | Age: 40
End: 2020-09-29

## 2020-09-29 VITALS
HEIGHT: 72 IN | DIASTOLIC BLOOD PRESSURE: 92 MMHG | TEMPERATURE: 96.6 F | OXYGEN SATURATION: 96 % | HEART RATE: 77 BPM | WEIGHT: 221.4 LBS | BODY MASS INDEX: 29.99 KG/M2 | SYSTOLIC BLOOD PRESSURE: 145 MMHG

## 2020-09-29 DIAGNOSIS — M84.362D STRESS FRACTURE OF LEFT TIBIA WITH ROUTINE HEALING, SUBSEQUENT ENCOUNTER: Primary | ICD-10-CM

## 2020-09-29 DIAGNOSIS — M25.562 LEFT KNEE PAIN, UNSPECIFIED CHRONICITY: ICD-10-CM

## 2020-09-29 PROCEDURE — 73560 X-RAY EXAM OF KNEE 1 OR 2: CPT | Performed by: ORTHOPAEDIC SURGERY

## 2020-09-29 PROCEDURE — 99213 OFFICE O/P EST LOW 20 MIN: CPT | Performed by: ORTHOPAEDIC SURGERY

## 2020-09-29 NOTE — LETTER
NOTIFICATION RETURN TO WORK / SCHOOL 
 
9/29/2020 4:38 PM 
 
Mr. Clemente Buckner 
[phone] [address] [address] To Whom It May Concern: 
 
Clemente Buckner is currently under the care of [address] [place] [address] Jamison Weber. He will remain on sitting duty only and will be using crutches for the next 3 weeks until he can be reevaluated in the office. If there are questions or concerns please have the patient contact our office. Sincerely, Lisbeth Hassan MD

## 2020-09-29 NOTE — LETTER
NOTIFICATION RETURN TO WORK / SCHOOL 
 
9/29/2020 4:35 PM 
 
Mr. Grace 
[phone] [address] [address] To Whom It May Concern: 
 
Pasquale Sosa is currently under the care of [address] [place] [address] Jamison Weber. He will remain on crutches for the next 3 weeks until he can be reevaluated in the office. If there are questions or concerns please have the patient contact our office. Sincerely, Katina Cox MD

## 2020-09-29 NOTE — PROGRESS NOTES
Cassi Ann  1980   Chief Complaint   Patient presents with    Leg Pain     left leg pain        HISTORY OF PRESENT ILLNESS  Cassi Ann is a 36 y.o. male who presents today for reevaluation of left leg pain. Patient rates pain as 5/10 today. Pain has been present since 8/17/2020. 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 Pt went to Urgent Care, was given Lodine. Has tried wearing a knee brace. Pt states he could not walk on his leg after the onset of pain. Has been using an MCL brace and taking Fosamax with some relief. Pt states he is doing better today. He presents today ambulating with crutches. Patient denies any fever, chills, chest pain, shortness of breath or calf pain. The remainder of the review of systems is negative. There are no new illness or injuries to report since last seen in the office. There are no changes to medications, allergies, family or social history. Pain Assessment  9/29/2020   Location of Pain Leg   Location Modifiers Left   Severity of Pain 5   Quality of Pain Sharp   Duration of Pain Other (Comment)   Duration of Pain Comment patient states he only experiences pain when he puts pressure/weight on his leg   Frequency of Pain Other (Comment)   Frequency of Pain Comment patient states he only experiences pain when he puts pressure/weight on his leg   Aggravating Factors Standing;Walking   Limiting Behavior Some   Relieving Factors Ice;Rest;Elevation       PHYSICAL EXAM:   Visit Vitals  BP (!) 145/92 (BP 1 Location: Right arm, BP Patient Position: Sitting)   Pulse 77   Temp (!) 96.6 °F (35.9 °C) (Temporal)   Ht 6' (1.829 m)   Wt 221 lb 6.4 oz (100.4 kg)   SpO2 96%   BMI 30.03 kg/m²     The patient is a well-developed, well-nourished male   in no acute distress.   The patient is alert and oriented times three. The patient is alert and oriented times three. Mood and affect are normal.  LYMPHATIC: lymph nodes are not enlarged and are within normal limits  SKIN: normal in color and non tender to palpation. There are no bruises or abrasions noted. NEUROLOGICAL: Motor sensory exam is within normal limits. Reflexes are equal bilaterally. There is normal sensation to pinprick and light touch  MUSCULOSKELETAL:  Examination Left knee   Skin Intact   Range of motion 0-130   Effusion +   Medial joint line tenderness +   Lateral joint line tenderness -   Tenderness Pes Bursa -   Tenderness insertion MCL -   Tenderness insertion LCL -   Sapphires -   Patella crepitus -   Patella grind -   Lachman -   Pivot shift -   Anterior drawer -   Posterior drawer -   Varus stress -   Valgus stress -   Neurovascular Intact   Calf Swelling and Tenderness to Palpation -   Saman's Test -   Hamstring Cord Tightness -       IMAGING: XR of left knee obtained in the office dated 9/29/2020 was reviewed and read by Dr. Margareth Wood: Sclerotic changes of the proximal tibia on the medial side. MRI of left knee dated 9/08/2020 was reviewed and read by Dr. Margareth Wood:   IMPRESSION:  1. Findings consistent with stress fracture, proximal medial tibial condyle, with surrounding local contusional marrow and minimal juxta osseous soft tissue edema. No gross arthritis. Additional small adjacent proximal tibial  intramedullary bone infarct. Top normal joint fluid volume and minimal popliteal cyst.  2. Intact menisci. 3. Intact cruciate ligaments. 4. Unremarkable anterior compartment. Superficial anteromedial knee susceptibility artifact; no dedicated knee plain films, but no apparent abnormality on prior femur series, 2016; finding is probably from cutaneous micrometal      IMPRESSION:      ICD-10-CM ICD-9-CM    1. Stress fracture of left tibia with routine healing, subsequent encounter  M84.362D V54.89    2.  Left knee pain, unspecified chronicity M25.562 719.46 AMB POC XRAY, KNEE; 1/2 VIEWS        PLAN:   1. Pt presents today with left leg pain due to an MRI-documented stress fracture of the tibia. Continue using crutches for 3 more weeks. Will see him back in 3 weeks for repeat XR. Was given a work note stating he will remain on sitting duty only over the next 3 weeks until he can be reevaluated in the office. Risk factors include: n/a  2. No ultrasound exam indicated today  3. No cortisone injection indicated today   4. No Physical/Occupational Therapy indicated today  5. No diagnostic test indicated today:   6. No durable medical equipment indicated today  7. No referral indicated today   8. No medications indicated today:   9. No Narcotic indicated today       RTC 3 weeks for repeat XR      Scribed by [address] as dictated by Ana Rodriguez MD    I, Dr. Ana Rodriguez, confirm that all documentation is accurate.     Ana Rodriguez M.D.   [facility]

## 2020-09-30 NOTE — TELEPHONE ENCOUNTER
Please let patient know it can take about 6-8 weeks for fracture to fully heal. He is welcome to see the xray at his next follow up.  We will print him off a copy

## 2020-09-30 NOTE — TELEPHONE ENCOUNTER
From: Maggie Spence  To: Teresa Akins MD  Sent: 9/29/2020 5:19 PM EDT  Subject: Test Results Question    I have a question about RADIOLOGIC EXAM, KNEE; 1/2 VIEWS resulted on 9/29/20 at 4:17 PM. I was wondering how long on average does a stress fracture like mine take to heal and if there is anyway I can see the x-ray to satisfy my own curiosity, thank you!

## 2020-10-08 DIAGNOSIS — M84.362A STRESS FRACTURE OF LEFT TIBIA, INITIAL ENCOUNTER: ICD-10-CM

## 2020-10-08 NOTE — TELEPHONE ENCOUNTER
Last Visit: 9/29/20 with MD Sanjana Casanova  Next Appointment: 10/12/20 with MD Sanjana Casanova  Previous Refill Encounter(s): 9/9/20 #4    Requested Prescriptions     Pending Prescriptions Disp Refills    alendronate (Fosamax) 70 mg tablet 4 Tab 0     Sig: Take 1 Tab by mouth every seven (7) days.

## 2020-10-09 RX ORDER — ALENDRONATE SODIUM 70 MG/1
70 TABLET ORAL
Qty: 4 TAB | Refills: 0 | Status: SHIPPED | OUTPATIENT
Start: 2020-10-09 | End: 2020-11-03

## 2020-10-12 ENCOUNTER — OFFICE VISIT (OUTPATIENT)
Dept: ORTHOPEDIC SURGERY | Age: 40
End: 2020-10-12
Payer: OTHER GOVERNMENT

## 2020-10-12 DIAGNOSIS — M25.562 LEFT KNEE PAIN, UNSPECIFIED CHRONICITY: ICD-10-CM

## 2020-10-12 DIAGNOSIS — M84.362D STRESS FRACTURE OF LEFT TIBIA WITH ROUTINE HEALING, SUBSEQUENT ENCOUNTER: Primary | ICD-10-CM

## 2020-10-12 PROCEDURE — 73560 X-RAY EXAM OF KNEE 1 OR 2: CPT | Performed by: ORTHOPAEDIC SURGERY

## 2020-10-12 PROCEDURE — 99213 OFFICE O/P EST LOW 20 MIN: CPT | Performed by: ORTHOPAEDIC SURGERY

## 2020-10-12 NOTE — PROGRESS NOTES
Victorino Quiroga  1980   Chief Complaint   Patient presents with    Leg Pain     left        HISTORY OF PRESENT ILLNESS  Victorino Quiroga is a 36 y.o. male who presents today for reevaluation of left leg pain. Patient rates pain as 5/10 today. Pain has been present since 8/17/2020. 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 Pt went to Urgent Care, was given Lodine. Has tried wearing a knee brace. Pt states he could not walk on his leg after the onset of pain. Has been using an MCL brace and taking Fosamax with some relief. Pt states he is doing better today. He presents today ambulating with crutches. He is able to bear weight on his leg with minimal pain. Patient denies any fever, chills, chest pain, shortness of breath or calf pain. The remainder of the review of systems is negative. There are no new illness or injuries to report since last seen in the office. There are no changes to medications, allergies, family or social history. Pain Assessment  10/12/2020   Location of Pain Leg   Location Modifiers Left   Severity of Pain 5   Quality of Pain Dull;Aching   Duration of Pain A few hours   Duration of Pain Comment -   Frequency of Pain Intermittent   Frequency of Pain Comment -   Aggravating Factors Other (Comment)   Aggravating Factors Comment Bump it or turn wrong way   Limiting Behavior -   Relieving Factors Rest       PHYSICAL EXAM:   There were no vitals taken for this visit. The patient is a well-developed, well-nourished male   in no acute distress. The patient is alert and oriented times three. The patient is alert and oriented times three. Mood and affect are normal.  LYMPHATIC: lymph nodes are not enlarged and are within normal limits  SKIN: normal in color and non tender to palpation.  There are no bruises or abrasions noted.   NEUROLOGICAL: Motor sensory exam is within normal limits. Reflexes are equal bilaterally. There is normal sensation to pinprick and light touch  MUSCULOSKELETAL:  Examination Left knee   Skin Intact   Range of motion 0-130   Effusion +   Medial joint line tenderness +   Lateral joint line tenderness -   Tenderness Pes Bursa -   Tenderness insertion MCL -   Tenderness insertion LCL -   Sapphires -   Patella crepitus -   Patella grind -   Lachman -   Pivot shift -   Anterior drawer -   Posterior drawer -   Varus stress -   Valgus stress -   Neurovascular Intact   Calf Swelling and Tenderness to Palpation -   Saman's Test -   Hamstring Cord Tightness -       IMAGING: XR of left knee obtained in the office dated 10/12/2020 was reviewed and read by Dr. Prema Sumner: Sclerotic rim over the medial tibial plateau. XR of left knee obtained in the office dated 9/29/2020 was reviewed and read by Dr. Prema Sumner: Sclerotic changes of the proximal tibia on the medial side. MRI of left knee dated 9/08/2020 was reviewed and read by Dr. Prema Sumner:   IMPRESSION:  1. Findings consistent with stress fracture, proximal medial tibial condyle, with surrounding local contusional marrow and minimal juxta osseous soft tissue edema. No gross arthritis. Additional small adjacent proximal tibial  intramedullary bone infarct. Top normal joint fluid volume and minimal popliteal cyst.  2. Intact menisci. 3. Intact cruciate ligaments. 4. Unremarkable anterior compartment. Superficial anteromedial knee susceptibility artifact; no dedicated knee plain films, but no apparent abnormality on prior femur series, 2016; finding is probably from cutaneous micrometal      IMPRESSION:      ICD-10-CM ICD-9-CM    1. Stress fracture of left tibia with routine healing, subsequent encounter  M84.362D V54.89    2. Left knee pain, unspecified chronicity  M25.562 719.46 AMB POC XRAY, KNEE; 1/2 VIEWS        PLAN:   1.  Pt presents today with left leg pain due to an MRI-documented stress fracture of the tibia. Should start weaning himself off of using the crutches over the next 2 weeks. Was given a work note stating he is to be doing mainly sitting work over the next 3 weeks until he can be reevaluated at next OV and that he will be weaning himself off of using his crutches over the next 2 weeks. Will see him back in 3 weeks. Risk factors include: n/a  2. No ultrasound exam indicated today  3. No cortisone injection indicated today   4. No Physical/Occupational Therapy indicated today  5. No diagnostic test indicated today:   6. No durable medical equipment indicated today  7. No referral indicated today   8. No medications indicated today:   9. No Narcotic indicated today       RTC 3 weeks      Scribed by Azul Doshi as dictated by Claudio Sparks MD    I, Dr. Claudio Sparks, confirm that all documentation is accurate.     Claudio Sparks M.D.   [facility]

## 2020-10-12 NOTE — PROGRESS NOTES
1. Have you been to the ER, urgent care clinic since your last visit? Hospitalized since your last visit? No    2. Have you seen or consulted any other health care providers outside of the [address] [place] [address] since your last visit? Include any pap smears or colon screening.  No

## 2020-10-12 NOTE — LETTER
NOTIFICATION RETURN TO WORK / SCHOOL 
 
10/12/2020 4:42 PM 
 
Mr. Ras Maki 
[address] [id number] [address] [address] To Whom It May Concern: 
 
Ras Maki is currently under the care of [address] [place] [address] Jamison Weber. He is to be doing mainly sitting work over the next 3 weeks until he can be reevaluated in the office. He will be weaning himself off of using the crutches over the next two weeks and is allowed to ambulate without crutches during this time. If there are questions or concerns please have the patient contact our office. Sincerely, Reny Qureshi MD

## 2020-11-02 ENCOUNTER — OFFICE VISIT (OUTPATIENT)
Dept: ORTHOPEDIC SURGERY | Age: 40
End: 2020-11-02
Payer: OTHER GOVERNMENT

## 2020-11-02 VITALS
HEIGHT: 72 IN | HEART RATE: 76 BPM | WEIGHT: 224 LBS | RESPIRATION RATE: 16 BRPM | SYSTOLIC BLOOD PRESSURE: 149 MMHG | BODY MASS INDEX: 30.34 KG/M2 | DIASTOLIC BLOOD PRESSURE: 105 MMHG | TEMPERATURE: 97.9 F | OXYGEN SATURATION: 98 %

## 2020-11-02 DIAGNOSIS — M84.362D STRESS FRACTURE OF LEFT TIBIA WITH ROUTINE HEALING, SUBSEQUENT ENCOUNTER: Primary | ICD-10-CM

## 2020-11-02 PROCEDURE — 99213 OFFICE O/P EST LOW 20 MIN: CPT | Performed by: ORTHOPAEDIC SURGERY

## 2020-11-02 NOTE — PROGRESS NOTES
Hamilton Mora  1980   Chief Complaint   Patient presents with    Leg Pain     left leg f/u         HISTORY OF PRESENT ILLNESS  Hamilton Mora is a [address] y.o. male who presents today for reevaluation of left leg pain. Patient rates pain as 1/10 today. Pain has been present since 8/17/2020. 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 Pt went to Urgent Care, was given Lodine. Has tried wearing a knee brace. Pt states he could not walk on his leg after the onset of pain. Has also tried using an MCL brace and taking Fosamax. Pt states he is doing better today, only notes an aching pain with prolonged walking. Patient denies any fever, chills, chest pain, shortness of breath or calf pain. The remainder of the review of systems is negative. There are no new illness or injuries to report since last seen in the office. There are no changes to medications, allergies, family or social history. Pain Assessment  11/2/2020   Location of Pain Leg   Location Modifiers Left   Severity of Pain 1   Quality of Pain Sharp   Duration of Pain A few minutes   Duration of Pain Comment -   Frequency of Pain Intermittent   Frequency of Pain Comment -   Aggravating Factors -   Aggravating Factors Comment -   Limiting Behavior Some   Relieving Factors Rest   Result of Injury No       PHYSICAL EXAM:   Visit Vitals  BP (!) 149/105   Pulse 76   Temp 97.9 °F (36.6 °C) (Temporal)   Resp 16   Ht 6' (1.829 m)   Wt 224 lb (101.6 kg)   SpO2 98%   BMI 30.38 kg/m²     The patient is a well-developed, well-nourished male   in no acute distress. The patient is alert and oriented times three. The patient is alert and oriented times three.  Mood and affect are normal.  LYMPHATIC: lymph nodes are not enlarged and are within normal limits  SKIN: normal in color and non tender to palpation. There are no bruises or abrasions noted. NEUROLOGICAL: Motor sensory exam is within normal limits. Reflexes are equal bilaterally. There is normal sensation to pinprick and light touch  MUSCULOSKELETAL:  Examination Left knee   Skin Intact   Range of motion 0-130   Effusion +   Medial joint line tenderness +   Lateral joint line tenderness -   Tenderness Pes Bursa -   Tenderness insertion MCL -   Tenderness insertion LCL -   Sapphires -   Patella crepitus -   Patella grind -   Lachman -   Pivot shift -   Anterior drawer -   Posterior drawer -   Varus stress -   Valgus stress -   Neurovascular Intact   Calf Swelling and Tenderness to Palpation -   Saman's Test -   Hamstring Cord Tightness -       IMAGING: XR of left knee obtained in the office dated 10/12/2020 was reviewed and read by Dr. Rogene Essex: Sclerotic rim over the medial tibial plateau. XR of left knee obtained in the office dated 9/29/2020 was reviewed and read by Dr. Rogene Essex: Sclerotic changes of the proximal tibia on the medial side. MRI of left knee dated 9/08/2020 was reviewed and read by Dr. Rogene Essex:   IMPRESSION:  1. Findings consistent with stress fracture, proximal medial tibial condyle, with surrounding local contusional marrow and minimal juxta osseous soft tissue edema. No gross arthritis. Additional small adjacent proximal tibial  intramedullary bone infarct. Top normal joint fluid volume and minimal popliteal cyst.  2. Intact menisci. 3. Intact cruciate ligaments. 4. Unremarkable anterior compartment. Superficial anteromedial knee susceptibility artifact; no dedicated knee plain films, but no apparent abnormality on prior femur series, 2016; finding is probably from cutaneous micrometal      IMPRESSION:      ICD-10-CM ICD-9-CM    1. Stress fracture of left tibia with routine healing, subsequent encounter  M84.362D V54.89         PLAN:   1.  Pt presents today with left leg pain due to an MRI-documented stress fracture of the tibia. He notes improvement today. Was given a work note keeping him on the same restrictions, mainly sitting work only, X 4 weeks until he can be reevaluated in the office. Risk factors include: n/a  2. No ultrasound exam indicated today  3. No cortisone injection indicated today   4. No Physical/Occupational Therapy indicated today  5. No diagnostic test indicated today:   6. No durable medical equipment indicated today  7. No referral indicated today   8. No medications indicated today:   9. No Narcotic indicated today       RTC 4 weeks      Scribed by Ezzard Schaumann [address]) as dictated by Patricia Griffin MD    I, Dr. Patricia Griffin, confirm that all documentation is accurate.     Patricia Griffin M.D.   [facility]

## 2020-11-02 NOTE — LETTER
NOTIFICATION RETURN TO WORK / SCHOOL 
 
11/2/2020 2:57 PM 
 
Mr. Nicole Diaz 
[address] [address] [address] [address] To Whom It May Concern: 
 
Nicole Diaz is currently under the care of [address] [place] [address] Jamison Weber. He will continue with the same restrictions, mainly sitting work only, while at work over the next 4 weeks until he can be reevaluated in the office. If there are questions or concerns please have the patient contact our office. Sincerely, Nichole Cai MD

## 2020-11-03 DIAGNOSIS — M84.362A STRESS FRACTURE OF LEFT TIBIA, INITIAL ENCOUNTER: ICD-10-CM

## 2020-11-03 RX ORDER — ALENDRONATE SODIUM 70 MG/1
TABLET ORAL
Qty: 4 TAB | Refills: 0 | Status: SHIPPED | OUTPATIENT
Start: 2020-11-03 | End: 2020-12-01

## 2020-11-28 DIAGNOSIS — M84.362A STRESS FRACTURE OF LEFT TIBIA, INITIAL ENCOUNTER: ICD-10-CM

## 2020-11-30 ENCOUNTER — OFFICE VISIT (OUTPATIENT)
Dept: ORTHOPEDIC SURGERY | Age: 40
End: 2020-11-30
Payer: OTHER GOVERNMENT

## 2020-11-30 VITALS
TEMPERATURE: 95.7 F | HEIGHT: 72 IN | HEART RATE: 81 BPM | SYSTOLIC BLOOD PRESSURE: 142 MMHG | RESPIRATION RATE: 16 BRPM | OXYGEN SATURATION: 98 % | BODY MASS INDEX: 30.1 KG/M2 | WEIGHT: 222.2 LBS | DIASTOLIC BLOOD PRESSURE: 103 MMHG

## 2020-11-30 DIAGNOSIS — M84.362D STRESS FRACTURE OF LEFT TIBIA WITH ROUTINE HEALING, SUBSEQUENT ENCOUNTER: Primary | ICD-10-CM

## 2020-11-30 PROCEDURE — 99213 OFFICE O/P EST LOW 20 MIN: CPT | Performed by: ORTHOPAEDIC SURGERY

## 2020-11-30 NOTE — PROGRESS NOTES
Nighat Rosales  1980   Chief Complaint   Patient presents with    Leg Pain     left leg pain        HISTORY OF PRESENT ILLNESS  Nighat Rosales is a 36 y.o. male who presents today for reevaluation of left leg pain. Patient rates pain as 0/10 today. Pain has been present since 8/17/2020. 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 Pt went to Urgent Care, was given Lodine. Has tried wearing a knee brace. Pt states he could not walk on his leg after the onset of pain. Has also tried using an MCL brace and taking Fosamax. Pt states he is doing better today. Notes overall improvement. No swelling present today. Patient denies any fever, chills, chest pain, shortness of breath or calf pain. The remainder of the review of systems is negative. There are no new illness or injuries to report since last seen in the office. There are no changes to medications, allergies, family or social history. PHYSICAL EXAM:   Visit Vitals  BP (!) 142/103 (BP 1 Location: Left arm, BP Patient Position: Sitting)   Pulse 81   Temp (!) 95.7 °F (35.4 °C) (Temporal)   Resp 16   Ht 6' (1.829 m)   Wt 222 lb 3.2 oz (100.8 kg)   SpO2 98%   BMI 30.14 kg/m²     The patient is a well-developed, well-nourished male   in no acute distress. The patient is alert and oriented times three. The patient is alert and oriented times three. Mood and affect are normal.  LYMPHATIC: lymph nodes are not enlarged and are within normal limits  SKIN: normal in color and non tender to palpation. There are no bruises or abrasions noted. NEUROLOGICAL: Motor sensory exam is within normal limits. Reflexes are equal bilaterally.  There is normal sensation to pinprick and light touch  MUSCULOSKELETAL:  Examination Left knee   Skin Intact   Range of motion 0-130   Effusion -   Medial joint line tenderness -   Lateral joint line tenderness -   Tenderness Pes Bursa -   Tenderness insertion MCL -   Tenderness insertion LCL -   Sapphires -   Patella crepitus -   Patella grind -   Lachman -   Pivot shift -   Anterior drawer -   Posterior drawer -   Varus stress -   Valgus stress -   Neurovascular Intact   Calf Swelling and Tenderness to Palpation -   Saman's Test -   Hamstring Cord Tightness -       IMAGING: XR of left knee obtained in the office dated 10/12/2020 was reviewed and read by Dr. Frances Ortiz: Sclerotic rim over the medial tibial plateau. XR of left knee obtained in the office dated 9/29/2020 was reviewed and read by Dr. Frances Ortiz: Sclerotic changes of the proximal tibia on the medial side. MRI of left knee dated 9/08/2020 was reviewed and read by Dr. Frances Ortiz:   IMPRESSION:  1. Findings consistent with stress fracture, proximal medial tibial condyle, with surrounding local contusional marrow and minimal juxta osseous soft tissue edema. No gross arthritis. Additional small adjacent proximal tibial  intramedullary bone infarct. Top normal joint fluid volume and minimal popliteal cyst.  2. Intact menisci. 3. Intact cruciate ligaments. 4. Unremarkable anterior compartment. Superficial anteromedial knee susceptibility artifact; no dedicated knee plain films, but no apparent abnormality on prior femur series, 2016; finding is probably from cutaneous micrometal      IMPRESSION:      ICD-10-CM ICD-9-CM    1. Stress fracture of left tibia with routine healing, subsequent encounter  M84.362D V54.89         PLAN:   1. Pt presents today with left leg pain due to an MRI-documented stress fracture of the tibia. He notes overall improvement today. Was given a work note today stating he cannot lift anything over 20 pounds X 1 month. D/c taking Fosamax. Risk factors include: n/a  2. No ultrasound exam indicated today  3. No cortisone injection indicated today   4.  No Physical/Occupational Therapy indicated today  5. No diagnostic test indicated today:   6. No durable medical equipment indicated today  7. No referral indicated today   8. No medications indicated today:   9. No Narcotic indicated today       RTC 1 month      Scribed by Jamilah Angel) as dictated by Breanne Dinh MD    I, Dr. Breanne Dinh, confirm that all documentation is accurate.     Breanne Dinh M.D.   [facility]

## 2020-11-30 NOTE — LETTER
NOTIFICATION RETURN TO WORK / SCHOOL 
 
11/30/2020 2:53 PM 
 
Mr. Best Carvalho 
[address] [id number] [address] [address] To Whom It May Concern: 
 
Best Carvalho is currently under the care of Nelda Jaspal [facility] Jamison Weber. He is not to be lifting above 20 pounds at work for 1 month. If there are questions or concerns please have the patient contact our office. Sincerely, Teressa Parisi MD

## 2020-12-01 RX ORDER — ALENDRONATE SODIUM 70 MG/1
TABLET ORAL
Qty: 4 TAB | Refills: 0 | Status: SHIPPED | OUTPATIENT
Start: 2020-12-01 | End: 2020-12-23 | Stop reason: ALTCHOICE

## 2020-12-09 ENCOUNTER — VIRTUAL VISIT (OUTPATIENT)
Dept: FAMILY MEDICINE CLINIC | Age: 40
End: 2020-12-09
Payer: COMMERCIAL

## 2020-12-09 DIAGNOSIS — U07.1 COVID-19: Primary | ICD-10-CM

## 2020-12-09 PROCEDURE — 99213 OFFICE O/P EST LOW 20 MIN: CPT | Performed by: INTERNAL MEDICINE

## 2020-12-09 NOTE — PROGRESS NOTES
Victorino Quiroga is a 36 y.o. male who was seen by synchronous (real-time) audio-video technology on 12/9/2020 for Concern For COVID-19 (Coronavirus)        Assessment & Plan:   Diagnoses and all orders for this visit:    1. COVID-19      COVID 19  Self quarantine for 14 days from onset of sxs (family at his house should quarantine as well)  To ER if worsens  OV 4-6 weeks with PCP   I have explained plan to patient and the patient verbalizes understanding    I spent at least 15 minutes on this visit with this established patient. 712  Subjective:   4 days of head congestion, facial discomfort, NP cough w/o dyspnea, CP, or N  +COVID test at start of sxs  Had myalgias/fever on day 1 but  fever has since resolved    Prior to Admission medications    Medication Sig Start Date End Date Taking? Authorizing Provider   alendronate (FOSAMAX) 70 mg tablet TAKE 1 TABLET BY MOUTH ONCE WEEKLY ON AN EMPTY STOMACH BEFORE BREAKFAST. REMAIN UPRIGHT FOR 30 MINUTES & TAKE WITH 8 OUNCES OF WATER 12/1/20   AISHA Suggs   sildenafil citrate (VIAGRA) 100 mg tablet TAKE 1 TABLET BY MOUTH AS NEEDED FOR ERECTILE DYSFUNCTION 10/3/20   Boyd Steve NP   predniSONE (DELTASONE) 10 mg tablet Day 1: 6 tablets Day 2: 5 tablets Day 3: 4 tablets Days 4,5,6: 3 tablets Days 7,8,9: 2 tablets Days 10,11,12: 1 tablet. 8/26/20   Boyd Steve NP   allopurinoL (ZYLOPRIM) 100 mg tablet TAKE ONE TABLET BY MOUTH DAILY 7/12/20   Boyd Steve NP   venlafaxine-SR [facility] 150 mg capsule Take 150 mg by mouth daily. 1/23/20   Provider, Historical   pyridoxine HCl, vitamin B6, (VITAMIN B-6 PO) Take 500 mg by mouth daily. Provider, Historical   clonazePAM (KLONOPIN) 2 mg tablet Take 2 mg by mouth three (3) times daily. Indications: PANIC DISORDER, QTY #90, Written on 8/18/16, Filled on 10/7/16. Prescriber Psychiatry Eileen Alvarez NP     Current Outpatient Medications   Medication Sig Dispense Refill    alendronate (FOSAMAX) 70 mg tablet TAKE 1 TABLET BY MOUTH ONCE WEEKLY ON AN EMPTY STOMACH BEFORE BREAKFAST. REMAIN UPRIGHT FOR 30 MINUTES & TAKE WITH 8 OUNCES OF WATER 4 Tab 0    sildenafil citrate (VIAGRA) 100 mg tablet TAKE 1 TABLET BY MOUTH AS NEEDED FOR ERECTILE DYSFUNCTION 30 Tab 2    predniSONE (DELTASONE) 10 mg tablet Day 1: 6 tablets Day 2: 5 tablets Day 3: 4 tablets Days 4,5,6: 3 tablets Days 7,8,9: 2 tablets Days 10,11,12: 1 tablet. 33 Tab 0    allopurinoL (ZYLOPRIM) 100 mg tablet TAKE ONE TABLET BY MOUTH DAILY 90 Tab 1    venlafaxine-SR (EFFEXOR-XR) 150 mg capsule Take 150 mg by mouth daily.  pyridoxine HCl, vitamin B6, (VITAMIN B-6 PO) Take 500 mg by mouth daily.  clonazePAM (KLONOPIN) 2 mg tablet Take 2 mg by mouth three (3) times daily. Indications: PANIC DISORDER, QTY #90, Written on 8/18/16, Filled on 10/7/16. Prescriber Psychiatry Eileen Renee       No Known Allergies  Past Medical History:   Diagnosis Date    Anxiety     Psychiatry Lois Lew Arthritis     Avascular necrosis ([facility] [address]     Minor Geronimo    [address] [place] [address] Depression     Psychiatry Lois MIGUEL [place] Gout     Hip pain, chronic     [facility] Dr. Donny Geronimo     Tobacco abuse      Past Surgical History:   Procedure Laterality Date    HX HIP REPLACEMENT Left 07/22/2015    [facility] Dr. Judd Morrow Right 11/05/2015    [facility] Dr. Britt Lester per HPI    Objective:   No flowsheet data found.    General: alert, cooperative, no distress   Mental  status: normal mood, behavior, speech, dress, motor activity, and thought processes, able to follow commands   HENT: NCAT   Neck: no visualized mass   Resp: no respiratory distress   Neuro: no gross deficits   Skin: no discoloration or lesions of concern on visible areas   Psychiatric: normal affect, consistent with stated mood, no evidence of hallucinations     Additional exam findings: no    We discussed the expected course, resolution and complications of the diagnosis(es) in detail. Medication risks, benefits, costs, interactions, and alternatives were discussed as indicated. I advised him to contact the office if his condition worsens, changes or fails to improve as anticipated. He expressed understanding with the diagnosis(es) and plan. Zahida Maker, who was evaluated through a patient-initiated, synchronous (real-time) audio-video encounter, and/or his healthcare decision maker, is aware that it is a billable service, with coverage as determined by his insurance carrier. He provided verbal consent to proceed: Yes, and patient identification was verified. It was conducted pursuant to the emergency declaration under the [address] [place] [address] authority and the [employer] General Act. A caregiver was present when appropriate. Ability to conduct physical exam was limited. I was at home. The patient was at home.       Klaudia Santos MD

## 2020-12-23 ENCOUNTER — VIRTUAL VISIT (OUTPATIENT)
Dept: FAMILY MEDICINE CLINIC | Age: 40
End: 2020-12-23
Payer: COMMERCIAL

## 2020-12-23 DIAGNOSIS — M10.00 ACUTE IDIOPATHIC GOUT, UNSPECIFIED SITE: Primary | ICD-10-CM

## 2020-12-23 DIAGNOSIS — E78.00 PURE HYPERCHOLESTEROLEMIA: ICD-10-CM

## 2020-12-23 DIAGNOSIS — N52.01 ERECTILE DYSFUNCTION DUE TO ARTERIAL INSUFFICIENCY: ICD-10-CM

## 2020-12-23 PROCEDURE — 99441 PR PHYS/QHP TELEPHONE EVALUATION 5-10 MIN: CPT | Performed by: NURSE PRACTITIONER

## 2020-12-23 RX ORDER — SILDENAFIL 100 MG/1
TABLET, FILM COATED ORAL
Qty: 30 TAB | Refills: 2 | Status: SHIPPED | OUTPATIENT
Start: 2020-12-23 | End: 2021-04-06 | Stop reason: ALTCHOICE

## 2020-12-23 RX ORDER — ALLOPURINOL 100 MG/1
TABLET ORAL
Qty: 90 TAB | Refills: 0 | Status: SHIPPED | OUTPATIENT
Start: 2020-12-23 | End: 2021-02-05

## 2020-12-23 NOTE — PROGRESS NOTES
Louann Kolb is a 36 y.o. male, evaluated via audio-only technology on 12/23/2020 for Gout, Other (ED), and Medication Refill  poor sound quality with doxy  Assessment & Plan:   Diagnoses and all orders for this visit:    1. Acute idiopathic gout, unspecified site  -     allopurinoL (ZYLOPRIM) 100 mg tablet; TAKE ONE TABLET BY MOUTH DAILY    2. Pure hypercholesterolemia    3. Erectile dysfunction due to arterial insufficiency  -     sildenafil citrate (VIAGRA) 100 mg tablet; TAKE 1 TABLET BY MOUTH AS NEEDED FOR ERECTILE DYSFUNCTION      Above stable, continue current treatment plan    Follow-up and Dispositions    · Return in about 3 months (around 3/23/2021) for gout, in office follow up. Routing History      12  Subjective:   Gout: Patient states he has been doing well. Denies any recent gout flares. Taking Allopurinol as prescribed  ED: Viagra working well at current dosage without any adverse effects. Prior to Admission medications    Medication Sig Start Date End Date Taking? Authorizing Provider   alendronate (FOSAMAX) 70 mg tablet TAKE 1 TABLET BY MOUTH ONCE WEEKLY ON AN EMPTY STOMACH BEFORE BREAKFAST. REMAIN UPRIGHT FOR 30 MINUTES & TAKE WITH 8 OUNCES OF WATER 12/1/20   AISHA Gomes   sildenafil citrate (VIAGRA) 100 mg tablet TAKE 1 TABLET BY MOUTH AS NEEDED FOR ERECTILE DYSFUNCTION 10/3/20   Karma Steve NP   predniSONE (DELTASONE) 10 mg tablet Day 1: 6 tablets Day 2: 5 tablets Day 3: 4 tablets Days 4,5,6: 3 tablets Days 7,8,9: 2 tablets Days 10,11,12: 1 tablet. 8/26/20   Karma Steve NP   allopurinoL (ZYLOPRIM) 100 mg tablet TAKE ONE TABLET BY MOUTH DAILY 7/12/20   Karma Steve NP   venlafaxine-SR [facility] 150 mg capsule Take 150 mg by mouth daily. 1/23/20   Provider, Historical   pyridoxine HCl, vitamin B6, (VITAMIN B-6 PO) Take 500 mg by mouth daily. Provider, Historical   clonazePAM (KLONOPIN) 2 mg tablet Take 2 mg by mouth three (3) times daily.  Indications: PANIC DISORDER, QTY #90, Written on 8/18/16, Filled on 10/7/16. Prescriber Psychiatry [place] [id number] Buffy Mast NP     Patient Active Problem List   Diagnosis Code    Poison sumac L23.7    Depression F32.9    Anxiety F41.9    Hip pain, chronic M25.559, G89.29    Avascular necrosis ([address] M87.00    Tobacco abuse Z72.0     Patient Active Problem List    Diagnosis Date Noted    Depression     Anxiety     Hip pain, chronic     Avascular necrosis (HCC)     Tobacco abuse     Poison sumac 09/06/2011     Current Outpatient Medications   Medication Sig Dispense Refill    alendronate (FOSAMAX) 70 mg tablet TAKE 1 TABLET BY MOUTH ONCE WEEKLY ON AN EMPTY STOMACH BEFORE BREAKFAST. REMAIN UPRIGHT FOR 30 MINUTES & TAKE WITH 8 OUNCES OF WATER 4 Tab 0    sildenafil citrate (VIAGRA) 100 mg tablet TAKE 1 TABLET BY MOUTH AS NEEDED FOR ERECTILE DYSFUNCTION 30 Tab 2    predniSONE (DELTASONE) 10 mg tablet Day 1: 6 tablets Day 2: 5 tablets Day 3: 4 tablets Days 4,5,6: 3 tablets Days 7,8,9: 2 tablets Days 10,11,12: 1 tablet. 33 Tab 0    allopurinoL (ZYLOPRIM) 100 mg tablet TAKE ONE TABLET BY MOUTH DAILY 90 Tab 1    venlafaxine-SR (EFFEXOR-XR) 150 mg capsule Take 150 mg by mouth daily.  pyridoxine HCl, vitamin B6, (VITAMIN B-6 PO) Take 500 mg by mouth daily.  clonazePAM (KLONOPIN) 2 mg tablet Take 2 mg by mouth three (3) times daily. Indications: PANIC DISORDER, QTY #90, Written on 8/18/16, Filled on 10/7/16. Prescriber Psychiatry [place]       No Known Allergies  Past Medical History:   Diagnosis Date    Anxiety     Psychiatry Lois Nielsen Arthritis     Avascular necrosis ([facility]     [facility] [place] Dr. Michelle Nowak    [place] Depression     Psychiatry Lois Nielsen Gout     Hip pain, chronic     [facility] Dr. Michelle Nowak     Tobacco abuse      Past Surgical History:   Procedure Laterality Date    HX HIP REPLACEMENT Left 07/22/2015    [facility] Dr. Xavi Basilio REPLACEMENT Right 2015    [facility] Jo[facility] Elmore     HX WISDOM TEETH EXTRACTION       Family History   Problem Relation Age of Onset    Anxiety Mother     Heart Attack Father     Diabetes Brother     Heart Disease Paternal Grandmother     Diabetes Paternal Grandmother     Heart Disease Paternal Grandfather      Social History     Tobacco Use    Smoking status: Former Smoker     Packs/day: 0.50     Years: 20.00     Pack years: 10.00     Types: Cigarettes     Quit date: 2020     Years since quittin.2    Smokeless tobacco: Never Used   Substance Use Topics    Alcohol use: Yes     Comment: 1 case of beer a week     ROS    No flowsheet data found. Best Carvalho, who was evaluated through a patient-initiated, synchronous (real-time) audio only encounter, and/or her healthcare decision maker, is aware that it is a billable service, with coverage as determined by his insurance carrier. He provided verbal consent to proceed: Yes. He has not had a related appointment within my department in the past 7 days or scheduled within the next 24 hours.       Total Time: minutes: 5-10 minutes    Randy Duong NP

## 2020-12-25 DIAGNOSIS — M84.362A STRESS FRACTURE OF LEFT TIBIA, INITIAL ENCOUNTER: ICD-10-CM

## 2020-12-28 RX ORDER — ALENDRONATE SODIUM 70 MG/1
TABLET ORAL
Qty: 4 TAB | Refills: 0 | Status: SHIPPED | OUTPATIENT
Start: 2020-12-28 | End: 2021-03-31 | Stop reason: ALTCHOICE

## 2021-01-11 ENCOUNTER — OFFICE VISIT (OUTPATIENT)
Dept: ORTHOPEDIC SURGERY | Age: 41
End: 2021-01-11
Payer: OTHER GOVERNMENT

## 2021-01-11 VITALS
TEMPERATURE: 96.6 F | RESPIRATION RATE: 16 BRPM | OXYGEN SATURATION: 97 % | BODY MASS INDEX: 30.77 KG/M2 | HEIGHT: 72 IN | SYSTOLIC BLOOD PRESSURE: 138 MMHG | HEART RATE: 78 BPM | WEIGHT: 227.2 LBS | DIASTOLIC BLOOD PRESSURE: 101 MMHG

## 2021-01-11 DIAGNOSIS — M84.362D STRESS FRACTURE OF LEFT TIBIA WITH ROUTINE HEALING, SUBSEQUENT ENCOUNTER: Primary | ICD-10-CM

## 2021-01-11 PROCEDURE — 99213 OFFICE O/P EST LOW 20 MIN: CPT | Performed by: ORTHOPAEDIC SURGERY

## 2021-01-11 NOTE — LETTER
NOTIFICATION RETURN TO WORK / SCHOOL 
 
1/11/2021 3:55 PM 
 
Mr. Lynsey Dwyer 
[address] [id number] [address] [address] To Whom It May Concern: 
 
Lynsey Dwyer is currently under the care of [address] [place] [address] Jamison Weber. He is not to be lifting above 20 pounds at work for the next 6 weeks. If there are questions or concerns please have the patient contact our office. Sincerely, Edwardo Hickey MD

## 2021-01-11 NOTE — PROGRESS NOTES
Jenna Gamez  1980   Chief Complaint   Patient presents with    Leg Pain     left leg pain        HISTORY OF PRESENT ILLNESS  Jenna Gamez is a 36 y.o. male who presents today for reevaluation of left leg. Patient rates pain as 0/10 today. Pain has been present since 8/17/2020. 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 Pt went to Urgent Care, was given Lodine. Has tried using an MCL brace and taking Fosamax. Notes overall improvement in the office today. Having some pain every now and then. Has been working with restrictions. Patient denies any fever, chills, chest pain, shortness of breath or calf pain. The remainder of the review of systems is negative. There are no new illness or injuries to report since last seen in the office. There are no changes to medications, allergies, family or social history. PHYSICAL EXAM:   Visit Vitals  BP (!) 138/101 (BP 1 Location: Right arm, BP Patient Position: Sitting)   Pulse 78   Temp (!) 96.6 °F (35.9 °C) (Temporal)   Resp 16   Ht 6' (1.829 m)   Wt 227 lb 3.2 oz (103.1 kg)   SpO2 97%   BMI 30.81 kg/m²     The patient is a well-developed, well-nourished male   in no acute distress. The patient is alert and oriented times three. The patient is alert and oriented times three. Mood and affect are normal.  LYMPHATIC: lymph nodes are not enlarged and are within normal limits  SKIN: normal in color and non tender to palpation. There are no bruises or abrasions noted. NEUROLOGICAL: Motor sensory exam is within normal limits. Reflexes are equal bilaterally.  There is normal sensation to pinprick and light touch  MUSCULOSKELETAL:  Examination Left knee   Skin Intact   Range of motion 0-130   Effusion -   Medial joint line tenderness -   Lateral joint line tenderness -   Tenderness Pes Bursa - Tenderness insertion MCL -   Tenderness insertion LCL -   Sapphires -   Patella crepitus -   Patella grind -   Lachman -   Pivot shift -   Anterior drawer -   Posterior drawer -   Varus stress -   Valgus stress -   Neurovascular Intact   Calf Swelling and Tenderness to Palpation -   Saman's Test -   Hamstring Cord Tightness -       IMAGING: XR of left knee obtained in the office dated 10/12/2020 was reviewed and read by Dr. Kadie Good: Sclerotic rim over the medial tibial plateau. XR of left knee obtained in the office dated 9/29/2020 was reviewed and read by Dr. Kadie Good: Sclerotic changes of the proximal tibia on the medial side. MRI of left knee dated 9/08/2020 was reviewed and read by Dr. Kadie Good:   IMPRESSION:  1. Findings consistent with stress fracture, proximal medial tibial condyle, with surrounding local contusional marrow and minimal juxta osseous soft tissue edema. No gross arthritis. Additional small adjacent proximal tibial  intramedullary bone infarct. Top normal joint fluid volume and minimal popliteal cyst.  2. Intact menisci. 3. Intact cruciate ligaments. 4. Unremarkable anterior compartment. Superficial anteromedial knee susceptibility artifact; no dedicated knee plain films, but no apparent abnormality on prior femur series, 2016; finding is probably from cutaneous micrometal      IMPRESSION:      ICD-10-CM ICD-9-CM    1. Stress fracture of left tibia with routine healing, subsequent encounter  M84.362D V54.89         PLAN:   1. Pt presents today with left leg pain due to an MRI-documented stress fracture of the tibia. He notes overall improvement today. Was given a work note today stating he cannot lift anything over 20 pounds X 6 more weeks. Will see him back in the office in 6 weeks. Risk factors include: n/a  2. No ultrasound exam indicated today  3. No cortisone injection indicated today   4. No Physical/Occupational Therapy indicated today  5.  No diagnostic test indicated today: 6. No durable medical equipment indicated today  7. No referral indicated today   8. No medications indicated today:   9. No Narcotic indicated today       RTC 6 weeks      Scribed by Shirley Alicia [address]) as dictated by Deanna Borges MD    I, Dr. Deanna Borges, confirm that all documentation is accurate.     Deanna Borges M.D.   [facility] [address] and Spine Specialist

## 2021-02-01 DIAGNOSIS — M10.00 ACUTE IDIOPATHIC GOUT, UNSPECIFIED SITE: ICD-10-CM

## 2021-02-05 RX ORDER — ALLOPURINOL 100 MG/1
TABLET ORAL
Qty: 90 TAB | Refills: 0 | Status: SHIPPED | OUTPATIENT
Start: 2021-02-05 | End: 2021-03-23 | Stop reason: SDUPTHER

## 2021-02-05 NOTE — TELEPHONE ENCOUNTER
Last Visit: 12/23/20 with ELIZABETH Smart  Next Appointment: 3/23/21 with ELIZABETH Smart  Previous Refill Encounter(s): 12/23/20 #90    Requested Prescriptions     Pending Prescriptions Disp Refills    allopurinoL (ZYLOPRIM) 100 mg tablet [Pharmacy Med Name: ALLOPURINOL 100 MG TABLET] 90 Tab 0     Sig: TAKE ONE TABLET BY MOUTH DAILY

## 2021-03-10 ENCOUNTER — OFFICE VISIT (OUTPATIENT)
Dept: ORTHOPEDIC SURGERY | Age: 41
End: 2021-03-10
Payer: OTHER GOVERNMENT

## 2021-03-10 VITALS
TEMPERATURE: 97.5 F | HEIGHT: 72 IN | RESPIRATION RATE: 16 BRPM | HEART RATE: 95 BPM | DIASTOLIC BLOOD PRESSURE: 89 MMHG | BODY MASS INDEX: 30.48 KG/M2 | WEIGHT: 225 LBS | SYSTOLIC BLOOD PRESSURE: 135 MMHG | OXYGEN SATURATION: 98 %

## 2021-03-10 DIAGNOSIS — M22.2X2 PATELLOFEMORAL PAIN SYNDROME OF LEFT KNEE: Primary | ICD-10-CM

## 2021-03-10 DIAGNOSIS — M25.562 ACUTE PAIN OF LEFT KNEE: ICD-10-CM

## 2021-03-10 DIAGNOSIS — M84.362D STRESS FRACTURE OF LEFT TIBIA WITH ROUTINE HEALING, SUBSEQUENT ENCOUNTER: ICD-10-CM

## 2021-03-10 PROCEDURE — 99213 OFFICE O/P EST LOW 20 MIN: CPT | Performed by: ORTHOPAEDIC SURGERY

## 2021-03-10 PROCEDURE — 73560 X-RAY EXAM OF KNEE 1 OR 2: CPT | Performed by: ORTHOPAEDIC SURGERY

## 2021-03-10 NOTE — LETTER
NOTIFICATION RETURN TO WORK / SCHOOL 
 
3/10/2021 4:09 PM 
 
Mr. Jenna Gamez 
[address] [id number] [address] [address] To Whom It May Concern: 
 
Jenna Gamez is currently under the care of [address] [place] [address] Jamison Weber. He is not to be lifting above 20 pounds while at work for the next 4 weeks until he can be reevaluated in the office. If there are questions or concerns please have the patient contact our office. Sincerely, Sanjana Flanagan MD

## 2021-03-10 NOTE — PROGRESS NOTES
Dagoberto Casarez  1980   Chief Complaint   Patient presents with    Leg Pain     left leg pain        HISTORY OF PRESENT ILLNESS  Dagoberto Casarez is a 36 y.o. male who presents today for reevaluation of left leg. Patient rates pain as 0/10 today. Pain has been present since 8/17/2020. 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 Pt went to Urgent Care, was given Lodine. Has tried using an MCL brace and taking Fosamax. Notes knee pain today, feels like the pain is in the joint. Has been back working on carriers. Having pain with stairs, ladders. Knee discomfort feels different from previous symptoms. Patient denies any fever, chills, chest pain, shortness of breath or calf pain. The remainder of the review of systems is negative. There are no new illness or injuries to report since last seen in the office. There are no changes to medications, allergies, family or social history. PHYSICAL EXAM:   Visit Vitals  /89 (BP 1 Location: Right arm, BP Patient Position: Sitting, BP Cuff Size: Large adult)   Pulse 95   Temp 97.5 °F (36.4 °C) (Temporal)   Resp 16   Ht 6' (1.829 m)   Wt 225 lb (102.1 kg)   SpO2 98%   BMI 30.52 kg/m²     The patient is a well-developed, well-nourished male   in no acute distress. The patient is alert and oriented times three. The patient is alert and oriented times three. Mood and affect are normal.  LYMPHATIC: lymph nodes are not enlarged and are within normal limits  SKIN: normal in color and non tender to palpation. There are no bruises or abrasions noted. NEUROLOGICAL: Motor sensory exam is within normal limits. Reflexes are equal bilaterally.  There is normal sensation to pinprick and light touch  MUSCULOSKELETAL:  Examination Left knee   Skin Intact   Range of motion 0-130   Effusion +   Medial joint line tenderness +   Lateral joint line tenderness -   Tenderness Pes Bursa -   Tenderness insertion MCL -   Tenderness insertion LCL -   Sapphires -   Patella crepitus -   Patella grind -   Lachman -   Pivot shift -   Anterior drawer -   Posterior drawer -   Varus stress -   Valgus stress -   Neurovascular Intact   Calf Swelling and Tenderness to Palpation -   Saman's Test -   Hamstring Cord Tightness -       IMAGING: XR of left knee with 2 views obtained in the office dated 3/10/2021 was reviewed and read by Dr. Michelle Laura: no acute abnormalities        XR of left knee obtained in the office dated 10/12/2020 was reviewed and read by Dr. Michelle Laura: Sclerotic rim over the medial tibial plateau. XR of left knee obtained in the office dated 9/29/2020 was reviewed and read by Dr. Michelle Laura: Sclerotic changes of the proximal tibia on the medial side. MRI of left knee dated 9/08/2020 was reviewed and read by Dr. Michelle Laura:   IMPRESSION:  1. Findings consistent with stress fracture, proximal medial tibial condyle, with surrounding local contusional marrow and minimal juxta osseous soft tissue edema. No gross arthritis. Additional small adjacent proximal tibial  intramedullary bone infarct. Top normal joint fluid volume and minimal popliteal cyst.  2. Intact menisci. 3. Intact cruciate ligaments. 4. Unremarkable anterior compartment. Superficial anteromedial knee susceptibility artifact; no dedicated knee plain films, but no apparent abnormality on prior femur series, 2016; finding is probably from cutaneous micrometal      IMPRESSION:      ICD-10-CM ICD-9-CM    1. Patellofemoral pain syndrome of left knee  M22.2X2 719.46 REFERRAL TO PHYSICAL THERAPY      meloxicam (Mobic) 15 mg tablet   2. Stress fracture of left tibia with routine healing, subsequent encounter  M84.362D V54.89 AMB POC XRAY, KNEE; 1/2 VIEWS      REFERRAL TO PHYSICAL THERAPY      meloxicam (Mobic) 15 mg tablet   3.  Acute pain of left knee  M25.562 719.46 AMB POC XRAY, KNEE; 1/2 VIEWS        PLAN:   1. Pt presents today with left leg pain due to an MRI-documented stress fracture of the tibia. Notes new left knee pain today and has symptoms c/w patellofemoral syndrome. Will set up with PT. Was given prescription for Mobic today. Was also given a work note today stating he cannot lift anything over 20 pounds X 4 more weeks until he is reevaluated in the office. Risk factors include: n/a  2. No ultrasound exam indicated today  3. No cortisone injection indicated today   4. Yes Physical/Occupational Therapy indicated today  5. No diagnostic test indicated today:   6. No durable medical equipment indicated today  7. No referral indicated today   8. Yes medications indicated today: MOBIC  9. No Narcotic indicated today       RTC 4 weeks      Scribed by Jennifer Nielsen) as dictated by Che Mon MD    I, Dr. Che Mon, confirm that all documentation is accurate.     Che Mon M.D.   [address] [place] [address] and Spine Specialist

## 2021-03-11 RX ORDER — MELOXICAM 15 MG/1
15 TABLET ORAL
Qty: 30 TAB | Refills: 1 | Status: SHIPPED
Start: 2021-03-11 | End: 2022-02-21 | Stop reason: SINTOL

## 2021-03-18 ENCOUNTER — HOSPITAL ENCOUNTER (OUTPATIENT)
Dept: PHYSICAL THERAPY | Age: 41
Discharge: HOME | End: 2021-03-18
Payer: COMMERCIAL

## 2021-03-18 PROCEDURE — 97162 PT EVAL MOD COMPLEX 30 MIN: CPT

## 2021-03-18 PROCEDURE — 97110 THERAPEUTIC EXERCISES: CPT

## 2021-03-18 PROCEDURE — 97535 SELF CARE MNGMENT TRAINING: CPT

## 2021-03-18 NOTE — PROGRESS NOTES
[facility]  [address]  [place], [address]  [phone] fax    Plan of Care/ Statement of Necessity for Physical Therapy Services    Patient name: Kevin Sidhu Start of Care: 3/18/2021   Referral source: Joshua Johnson,* : 1980    Medical Diagnosis: Left knee pain [M25.562]  Pain in left leg [M79.605]  Payor: DEPARTMENT OF LABOR / Plan: [facility] [employer]PARTMENT OF [employer] / Product Type: Workers Comp /  Onset Date: 2-3 weeks ago   Treatment Diagnosis: left knee pain    Prior Hospitalization: see medical history Provider#: [id number]   Medications: Verified on Patient summary List    Comorbidities: anxiety or panic disorders, arthritis, prior surgery, prosthesis/implants    Prior Level of Function: limited to 20# lifting restriction with work, able to tolerate mobility around carrier without left knee pain since return to work from tibia fracture      The Plan of Care and following information is based on the information from the initial evaluation. Assessment/ key information: Patient is a 36year old male with history of left tibia fracture in 2020 and reports recent onset of left knee pain beginning ~2-3 weeks ago. Patient reports he is on limited duty at work with 20# lifting restrictions. Patient currently limited with walking >1/2 mile, stair negotiation, negotiation around the carrier, and standing for prolonged periods. He demonstrates excellent left knee AROM and good patellar mobility. He demonstrates slightly decreased strength through the left LE, instability through left single leg balance, and increased lateral joint line tenderness and patellar tendon. Observed patient performing squat to table with increased weight shift to right LE and mild knee valgus present. Patient provided HEP to begin addressing impairments.  Patient would benefit from skilled PT 2x/week for 4 weeks to address the above impairments and promote return to prior level of function without discomfort. Evaluation Complexity History HIGH Complexity :3+ comorbidities / personal factors will impact the outcome/ POC ; Examination MEDIUM Complexity : 3 Standardized tests and measures addressing body structure, function, activity limitation and / or participation in recreation  ;Presentation MEDIUM Complexity : Evolving with changing characteristics  ; Clinical Decision Making MEDIUM Complexity : FOTO score of 26-74  Overall Complexity Rating: MEDIUM  Problem List: pain affecting function, decrease ROM, decrease strength, edema affecting function, impaired gait/ balance, decrease ADL/ functional abilitiies, decrease activity tolerance, decrease flexibility/ joint mobility and decrease transfer abilities   Treatment Plan may include any combination of the following: Therapeutic exercise, Therapeutic activities, Neuromuscular re-education, Physical agent/modality, Gait/balance training, Manual therapy, Patient education, Self Care training, Functional mobility training, Home safety training and Stair training  Patient / Family readiness to learn indicated by: asking questions, trying to perform skills and interest  Persons(s) to be included in education: patient (P)  Barriers to Learning/Limitations: None  Patient Goal (s): stronger knee  Patient Self Reported Health Status: good  Rehabilitation Potential: good    Short Term Goals: To be accomplished in 2 weeks:  1. Patient will report performance of home exercise program 4 of 7 days in the next week demonstrating compliance to therapy program and progress towards independent management of symptoms. Long Term Goals: To be accomplished in 6 weeks:  1. Patient will demonstrate 10 squats with equal weight through bilateral LE and no knee valgus present to improve transfers and mobility with greater ease.    2. Patient will increase left glute med and max strength to 5-/5 to improve stair negotiation and ambulation. 3. Patient will report no difficulty with negotiation of the carrier in regards to left knee pain to promote return to full work duty. 4. Patient will report <4/10 pain at worst to improve functional mobility and greater ease with ambulation. 5. Patient will improve FOTO score to 76 to demonstrate return to prior level of function and to improve patients ability to perform usual household and work duties with greater ease. Frequency / Duration: Patient to be seen 2 times per week for 6 weeks. Patient/ Caregiver education and instruction: Diagnosis, prognosis, activity modification, exercises and other cold pack application to assist with inflammation and discomfort   [x]  Plan of care has been reviewed with ALFIE Deleon 3/18/2021 4:33 PM    ________________________________________________________________________    I certify that the above Therapy Services are being furnished while the patient is under my care. I agree with the treatment plan and certify that this therapy is necessary.     [de-identified] Signature:____________Date:_________TIME:________     Esthela Pineda,*  ** Signature, Date and Time must be completed for valid certification **    Please sign and return to [facility] [address] [place] [address] & [facility]  [address]  [place], [address]  [phone] fax

## 2021-03-18 NOTE — PROGRESS NOTES
PT DAILY TREATMENT NOTE     Patient Name: Una Duran  Date:3/18/2021  : 1980  [x]  Patient  Verified  Payor: DEPARTMENT OF LABOR / Plan: [address] [place] [address] Avenue / Product Type: Workers Comp /    In time: 338  Out time:415  Total Treatment Time (min): 37  Visit #: 1 of 12    Treatment Area: Left knee pain [M25.562]  Pain in left leg [M79.605]    SUBJECTIVE  Pain Level (0-10 scale): 3  Any medication changes, allergies to medications, adverse drug reactions, diagnosis change, or new procedure performed?: [x] No    [] Yes (see summary sheet for update)  Subjective functional status/changes:   [] No changes reported  Patient reports a stress fracture to his left tibia in 2020. Patient with reports of left knee pain beginning ~2-3 weeks ago that has slowly progressed. He reports ache to the anterior knee/quad tendon with intermittent \"sharp\" pains at times. He is currently on 20 pound lifting restriction from the stress fracture but reports at times he lifts slightly more. He reports increased symptoms with overactivity and decreased symptoms with rest.     OBJECTIVE    15 min [x]Eval                  []Re-Eval       14 min Therapeutic Exercise:  [x] See flow sheet: Patient will report performance of home exercise program 4 of 7 days in the next week demonstrating compliance to therapy program and progress towards independent management of symptoms. Rationale: increase ROM, increase strength and improve coordination to improve the patients ability to perform ADLs and self care tasks with greater ease     8 min Self Care and Home Management:  [x]  See flow sheet: Patient education provided on pathology, treatment findings, and plan of care. Education provided on use of ice as needed to assist with discomfort and swelling.     Rationale: improve coordination  to improve the patients ability to perform functional mobility with greater ease        With   [] TE   [] TA   [] neuro   [] other: Patient Education: [x] Review HEP    [] Progressed/Changed HEP based on:   [] positioning   [] body mechanics   [] transfers   [] heat/ice application    [] other:      Other Objective/Functional Measures: see paper chart for evaluation form     Pain Level (0-10 scale) post treatment: 4    ASSESSMENT/Changes in Function: Patient is a 36year old male with history of left tibia fracture in August 2020 and reports recent onset of left knee pain beginning ~2-3 weeks ago. Patient reports he is on limited duty at work with 20# lifting restrictions. Patient currently limited with walking >1/2 mile, stair negotiation, negotiation around the carrier, and standing for prolonged periods. He demonstrates excellent left knee AROM and good patellar mobility. He demonstrates slightly decreased strength through the left LE, instability through left single leg balance, and increased lateral joint line tenderness and patellar tendon. Observed patient performing squat to table with increased weight shift to right LE and mild knee valgus present. Patient provided HEP to begin addressing impairments. Patient would benefit from skilled PT 2x/week for 4 weeks to address the above impairments and promote return to prior level of function without discomfort. Patient will continue to benefit from skilled PT services to modify and progress therapeutic interventions, address functional mobility deficits, address ROM deficits, address strength deficits, analyze and address soft tissue restrictions, analyze and cue movement patterns, analyze and modify body mechanics/ergonomics, assess and modify postural abnormalities, address imbalance/dizziness and instruct in home and community integration to attain remaining goals. [x]  See Plan of Care  []  See progress note/recertification  []  See Discharge Summary         Progress towards goals / Updated goals:  Short Term Goals: To be accomplished in 2 weeks:  1.  Patient will report performance of home exercise program 4 of 7 days in the next week demonstrating compliance to therapy program and progress towards independent management of symptoms. Eval: HEP provided      Long Term Goals: To be accomplished in 6 weeks:  1. Patient will demonstrate 10 squats with equal weight through bilateral LE and no knee valgus present to improve transfers and mobility with greater ease. Eval: 3 squats with weight shift to the right and slight valgus noted on left knee   2. Patient will increase left glute med and max strength to 5-/5 to improve stair negotiation and ambulation. Eval: glute med 4+/5, glute max 4-/5  3. Patient will report no difficulty with negotiation of the carrier in regards to left knee pain to promote return to full work duty. Eval: moderate to a lot of difficulty   4. Patient will report <4/10 pain at worst to improve functional mobility and greater ease with ambulation. Eval: 7-8/10 after full day  5. Patient will improve FOTO score to 76 to demonstrate return to prior level of function and to improve patients ability to perform usual household and work duties with greater ease.    Eval: 47    PLAN  [x]  Upgrade activities as tolerated     []  Continue plan of care  []  Update interventions per flow sheet       []  Discharge due to:_  []  Other:_      Monroe Carvalho PT, DPT 3/18/2021  4:19 PM    Future Appointments   Date Time Provider Jeff Huston   3/23/2021  2:20 PM Jos Jackson NP [facility] BS AMB   4/7/2021  4:10 PM Rosi Ayala MD [facility] BS AMB

## 2021-03-23 ENCOUNTER — OFFICE VISIT (OUTPATIENT)
Dept: FAMILY MEDICINE CLINIC | Age: 41
End: 2021-03-23
Payer: COMMERCIAL

## 2021-03-23 ENCOUNTER — HOSPITAL ENCOUNTER (OUTPATIENT)
Dept: LAB | Age: 41
Discharge: HOME | End: 2021-03-23
Payer: COMMERCIAL

## 2021-03-23 VITALS
OXYGEN SATURATION: 98 % | BODY MASS INDEX: 30.61 KG/M2 | RESPIRATION RATE: 18 BRPM | WEIGHT: 226 LBS | DIASTOLIC BLOOD PRESSURE: 98 MMHG | TEMPERATURE: 97 F | HEIGHT: 72 IN | HEART RATE: 96 BPM | SYSTOLIC BLOOD PRESSURE: 150 MMHG

## 2021-03-23 DIAGNOSIS — R03.0 ELEVATED BLOOD PRESSURE READING: ICD-10-CM

## 2021-03-23 DIAGNOSIS — R03.0 ELEVATED BLOOD PRESSURE READING: Primary | ICD-10-CM

## 2021-03-23 DIAGNOSIS — M10.00 ACUTE IDIOPATHIC GOUT, UNSPECIFIED SITE: ICD-10-CM

## 2021-03-23 DIAGNOSIS — N52.01 ERECTILE DYSFUNCTION DUE TO ARTERIAL INSUFFICIENCY: ICD-10-CM

## 2021-03-23 LAB
ALBUMIN SERPL-MCNC: 4.2 G/DL (ref 3.4–5)
ALBUMIN/GLOB SERPL: 1.1 {RATIO} (ref 0.8–1.7)
ALP SERPL-CCNC: 91 U/L (ref 45–117)
ALT SERPL-CCNC: 40 U/L (ref 16–61)
ANION GAP SERPL CALC-SCNC: 8 MMOL/L (ref 3–18)
AST SERPL-CCNC: 29 U/L (ref 10–38)
BILIRUB SERPL-MCNC: 0.3 MG/DL (ref 0.2–1)
BUN SERPL-MCNC: 17 MG/DL (ref 7–18)
BUN/CREAT SERPL: 20 (ref 12–20)
CALCIUM SERPL-MCNC: 9.6 MG/DL (ref 8.5–10.1)
CHLORIDE SERPL-SCNC: 105 MMOL/L (ref 100–111)
CHOLEST SERPL-MCNC: 211 MG/DL
CO2 SERPL-SCNC: 26 MMOL/L (ref 21–32)
CREAT SERPL-MCNC: 0.87 MG/DL (ref 0.6–1.3)
GLOBULIN SER CALC-MCNC: 3.7 G/DL (ref 2–4)
GLUCOSE SERPL-MCNC: 86 MG/DL (ref 74–99)
HDLC SERPL-MCNC: 63 MG/DL (ref 40–60)
HDLC SERPL: 3.3 {RATIO} (ref 0–5)
LDLC SERPL CALC-MCNC: 97.4 MG/DL (ref 0–100)
LIPID PROFILE,FLP: ABNORMAL
POTASSIUM SERPL-SCNC: 4.6 MMOL/L (ref 3.5–5.5)
PROT SERPL-MCNC: 7.9 G/DL (ref 6.4–8.2)
SODIUM SERPL-SCNC: 139 MMOL/L (ref 136–145)
TRIGL SERPL-MCNC: 253 MG/DL (ref ?–150)
URATE SERPL-MCNC: 5.9 MG/DL (ref 2.6–7.2)
VLDLC SERPL CALC-MCNC: 50.6 MG/DL

## 2021-03-23 PROCEDURE — 36415 COLL VENOUS BLD VENIPUNCTURE: CPT

## 2021-03-23 PROCEDURE — 80053 COMPREHEN METABOLIC PANEL: CPT

## 2021-03-23 PROCEDURE — 80061 LIPID PANEL: CPT

## 2021-03-23 PROCEDURE — 99213 OFFICE O/P EST LOW 20 MIN: CPT | Performed by: NURSE PRACTITIONER

## 2021-03-23 PROCEDURE — 84550 ASSAY OF BLOOD/URIC ACID: CPT

## 2021-03-23 RX ORDER — ALLOPURINOL 100 MG/1
TABLET ORAL
Qty: 90 TAB | Refills: 0 | Status: SHIPPED | OUTPATIENT
Start: 2021-03-23 | End: 2021-08-17 | Stop reason: SDUPTHER

## 2021-03-23 RX ORDER — SILDENAFIL 100 MG/1
TABLET, FILM COATED ORAL
Qty: 30 TAB | Refills: 2 | Status: CANCELLED | OUTPATIENT
Start: 2021-03-23

## 2021-03-23 RX ORDER — HYDROCHLOROTHIAZIDE 25 MG/1
25 TABLET ORAL DAILY
Qty: 30 TAB | Refills: 2 | Status: SHIPPED | OUTPATIENT
Start: 2021-03-23 | End: 2021-06-21

## 2021-03-23 NOTE — PATIENT INSTRUCTIONS
Low Sodium Diet (2,000 Milligram): Care Instructions  Your Care Instructions     Too much sodium causes your body to hold on to extra water. This can raise your blood pressure and force your heart and kidneys to work harder. In very serious cases, this could cause you to be put in the hospital. It might even be life-threatening. By limiting sodium, you will feel better and lower your risk of serious problems.  The most common source of sodium is salt. People get most of the salt in their diet from canned, prepared, and packaged foods. Fast food and restaurant meals also are very high in sodium. Your doctor will probably limit your sodium to less than 2,000 milligrams (mg) a day. This limit counts all the sodium in prepared and packaged foods and any salt you add to your food.  Follow-up care is a key part of your treatment and safety. Be sure to make and go to all appointments, and call your doctor if you are having problems. It's also a good idea to know your test results and keep a list of the medicines you take.  How can you care for yourself at home?  Read food labels  · Read labels on cans and food packages. The labels tell you how much sodium is in each serving. Make sure that you look at the serving size. If you eat more than the serving size, you have eaten more sodium.  · Food labels also tell you the Percent Daily Value for sodium. Choose products with low Percent Daily Values for sodium.  · Be aware that sodium can come in forms other than salt, including monosodium glutamate (MSG), sodium citrate, and sodium bicarbonate (baking soda). MSG is often added to Asian food. When you eat out, you can sometimes ask for food without MSG or added salt.  Buy low-sodium foods  · Buy foods that are labeled \"unsalted\" (no salt added), \"sodium-free\" (less than 5 mg of sodium per serving), or \"low-sodium\" (less than 140 mg of sodium per serving). Foods labeled \"reduced-sodium\" and \"light sodium\" may still have too  much sodium. Be sure to read the label to see how much sodium you are getting. · Buy fresh vegetables, or frozen vegetables without added sauces. Buy low-sodium versions of canned vegetables, soups, and other canned goods. Prepare low-sodium meals  · Cut back on the amount of salt you use in cooking. This will help you adjust to the taste. Do not add salt after cooking. One teaspoon of salt has about 2,300 mg of sodium. · Take the salt shaker off the table. · Flavor your food with garlic, lemon juice, onion, vinegar, herbs, and spices. Do not use soy sauce, lite soy sauce, steak sauce, onion salt, garlic salt, celery salt, mustard, or ketchup on your food. · Use low-sodium salad dressings, sauces, and ketchup. Or make your own salad dressings and sauces without adding salt. · Use less salt (or none) when recipes call for it. You can often use half the salt a recipe calls for without losing flavor. Other foods such as rice, pasta, and grains do not need added salt. · Rinse canned vegetables, and cook them in fresh water. This removes some--but not all--of the salt. · Avoid water that is naturally high in sodium or that has been treated with water softeners, which add sodium. Call your local water company to find out the sodium content of your water supply. If you buy bottled water, read the label and choose a sodium-free brand. Avoid high-sodium foods  · Avoid eating:  ? Smoked, cured, salted, and canned meat, fish, and poultry. ? Ham, [place], hot dogs, and luncheon meats. ? Regular, hard, and processed cheese and regular peanut butter. ? Crackers with salted tops, and other salted snack foods such as pretzels, chips, and salted popcorn. ? Frozen prepared meals, unless labeled low-sodium. ? Canned and dried soups, broths, and bouillon, unless labeled sodium-free or low-sodium. ? Canned vegetables, unless labeled sodium-free or low-sodium. ? [place] fries, pizza, tacos, and other fast foods.   ? Norberto Hicks olives, ketchup, and other condiments, especially soy sauce, unless labeled sodium-free or low-sodium. Where can you learn more? Go to http://www.gray.com/  Enter V843 in the search box to learn more about \"Low Sodium Diet (2,000 Milligram): Care Instructions. \"  Current as of: August 22, 2019               Content Version: 12.6  © 4386-5161 [employer]. Care instructions adapted under license by [employer] (which disclaims liability or warranty for this information). If you have questions about a medical condition or this instruction, always ask your healthcare professional. [address] any warranty or liability for your use of this information. Learning About Low-Sodium Foods  What foods are low in sodium? The foods you eat contain nutrients, such as vitamins and minerals. Sodium is a nutrient. Your body needs the right amount to stay healthy and work as it should. You can use the list below to help you make choices about which foods to eat.   Fruits  · Fresh, frozen, canned, or dried fruit  Vegetables  · Fresh or frozen vegetables, with no added salt  · Canned vegetables, low-sodium or with no added salt  Grains  · Bagels without salted tops  · Cereal with no added salt  · Corn tortillas  · Crackers with no added salt  · Oatmeal, cooked without salt  · Popcorn with no salt  · Pasta and noodles, cooked without salt  · Rice, cooked without salt  · Unsalted pretzels  Dairy and dairy alternatives  · Butter, unsalted  · Cream cheese  · Ice cream  · Milk  · Soy milk  Meats and other protein foods  · Beans and peas, canned with no salt  · Eggs  · Fresh fish (not smoked)  · Fresh meats (not smoked or cured)  · Nuts and nut butter, prepared without salt  · Poultry, not packaged with sodium solution  · Tofu, unseasoned  · Tuna, canned without salt  Seasonings  · Garlic  · Herbs and spices  · Lemon juice  · Mustard  · Olive oil  · Salt-free seasoning mixes  · Vinegar  Work with your doctor to find out how much of this nutrient you need. Depending on your health, you may need more or less of it in your diet. Where can you learn more? Go to http://www.gray.com/  Enter S460 in the search box to learn more about \"Learning About Low-Sodium Foods. \"  Current as of: August 22, 2019               Content Version: 12.6  © 2027-1543 [employer]. Care instructions adapted under license by [employer] (which disclaims liability or warranty for this information). If you have questions about a medical condition or this instruction, always ask your healthcare professional. [address] any warranty or liability for your use of this information. How to Read a Food Label to Limit Sodium: Care Instructions  Your Care Instructions  Sodium causes your body to hold on to extra water. This can raise your blood pressure and force your heart and kidneys to work harder. In very serious cases, this could cause you to be put in the hospital. It might even be life-threatening. By limiting sodium, you will feel better and lower your risk of serious problems. Processed foods, fast food, and restaurant foods are the major sources of dietary sodium. The most common name for sodium is salt. Try to limit how much sodium you eat to less than 2,300 milligrams (mg) a day. If you limit your sodium to 1,500 mg a day, you can lower your blood pressure even more. This limit counts all the salt that you eat in foods you cook or in packaged foods. Keep a list of everything you eat and drink. Follow-up care is a key part of your treatment and safety. Be sure to make and go to all appointments, and call your doctor if you are having problems. It's also a good idea to know your test results and keep a list of the medicines you take. How can you care for yourself at home?   Read ingredient lists on food labels  · Read the list of ingredients on food labels to help you find how much sodium is in a food. The label lists the ingredients in a food in descending order (from the most to the least). If salt or sodium is high on the list, there may be a lot of sodium in the food. · Know that sodium has different names. Sodium is also called monosodium glutamate (MSG, common in [facility] food), sodium citrate, sodium alginate, sodium hydroxide, and sodium phosphate. Read Nutrition Facts labels  · On most foods, there is a Nutrition Facts label. This will tell you how much sodium is in one serving of food. Look at both the serving size and the sodium amount. The serving size is located at the top of the label, usually right under the \"Nutrition Facts\" title. The amount of sodium is given in the list under the title. It is given in milligrams (mg). ? Check the serving size carefully. A single serving is often very small, and you may eat more than one serving. If this is the case, you will eat more sodium than listed on the label. For example, if the serving size for a canned soup is 1 cup and the sodium amount is 470 mg, if you have 2 cups you will eat 940 mg of sodium. · The nutrition facts for fresh fruits and vegetables are not listed on the food. They may be listed somewhere in the store. These foods usually have no sodium or low sodium. · The Nutrition Facts label also gives you the Percent Daily Value for sodium. This is how much of the recommended amount of sodium a serving contains. The daily value for sodium is less than 2,300 mg. So if the Percent Daily Value says 50%, this means one serving is giving you half of this, or 1,150 mg. Buy low-sodium foods  · Look for foods that are made with less sodium. Watch for the following words on the label. ? \"Unsalted\" means there is no sodium added to the food. But there may be sodium already in the food naturally. ?  \"Sodium-free\" means a serving has less than 5 mg of sodium. ? \"Very low sodium\" means a serving has 35 mg or less of sodium. ? \"Low-sodium\" means a serving has 140 mg or less of sodium. · \"Reduced-sodium\" means that there is 25% less sodium than what the food normally has. This is still usually too much sodium. Try not to buy foods with this on the label. · Buy fresh vegetables, or frozen vegetables without added sauces. Buy low-sodium versions of canned vegetables, soups, and other canned goods. Where can you learn more? Go to http://www.gray.com/  Enter B757 in the search box to learn more about \"How to Read a Food Label to Limit Sodium: Care Instructions. \"  Current as of: August 22, 2019               Content Version: 12.6  © 4041-6746 [employer]. Care instructions adapted under license by [employer] (which disclaims liability or warranty for this information). If you have questions about a medical condition or this instruction, always ask your healthcare professional. [place] [address] any warranty or liability for your use of this information. Elevated Blood Pressure: Care Instructions  Your Care Instructions    Blood pressure is a measure of how hard the blood pushes against the walls of your arteries. It's normal for blood pressure to go up and down throughout the day. But if it stays up over time, you have high blood pressure. Two numbers tell you your blood pressure. The first number is the systolic pressure. It shows how hard the blood pushes when your heart is pumping. The second number is the diastolic pressure. It shows how hard the blood pushes between heartbeats, when your heart is relaxed and filling with blood. An ideal blood pressure in adults is less than 120/80 (say \"120 over 80\"). High blood pressure is 140/90 or higher. You have high blood pressure if your top number is 140 or higher or your bottom number is 90 or higher, or both.   The main test for high blood pressure is simple, fast, and painless. To diagnose high blood pressure, your doctor will test your blood pressure at different times. After testing your blood pressure, your doctor may ask you to test it again when you are home. If you are diagnosed with high blood pressure, you can work with your doctor to make a long-term plan to manage it. Follow-up care is a key part of your treatment and safety. Be sure to make and go to all appointments, and call your doctor if you are having problems. It's also a good idea to know your test results and keep a list of the medicines you take. How can you care for yourself at home? · Do not smoke. Smoking increases your risk for heart attack and stroke. If you need help quitting, talk to your doctor about stop-smoking programs and medicines. These can increase your chances of quitting for good. · Stay at a healthy weight. · Try to limit how much sodium you eat to less than 2,300 milligrams (mg) a day. Your doctor may ask you to try to eat less than 1,500 mg a day. · Be physically active. Get at least 30 minutes of exercise on most days of the week. Walking is a good choice. You also may want to do other activities, such as running, swimming, cycling, or playing tennis or team sports. · Avoid or limit alcohol. Talk to your doctor about whether you can drink any alcohol. · Eat plenty of fruits, vegetables, and low-fat dairy products. Eat less saturated and total fats. · Learn how to check your blood pressure at home. When should you call for help? Call your doctor now or seek immediate medical care if:  ? · Your blood pressure is much higher than normal (such as 180/110 or higher). ? · You think high blood pressure is causing symptoms such as:  ¨ Severe headache. ¨ Blurry vision. ? Watch closely for changes in your health, and be sure to contact your doctor if:  ? · You do not get better as expected. Where can you learn more?   Go to http://www.gray.com/. Enter D794 in the search box to learn more about \"Elevated Blood Pressure: Care Instructions. \"  Current as of: September 21, 2016  Content Version: 11.4  © 3389-3879 [employer], [employer]. Care instructions adapted under license by [employer] (which disclaims liability or warranty for this information). If you have questions about a medical condition or this instruction, always ask your healthcare professional. [place] [address] any warranty or liability for your use of this information.

## 2021-03-23 NOTE — PROGRESS NOTES
Hayde Samayoa is a 36 y.o. male who was seen in clinic today (3/23/2021) for Gout and Erectile Dysfunction        Assessment & Plan:   Diagnoses and all orders for this visit:    1. Elevated blood pressure reading  -     LIPID PANEL; Future  -     METABOLIC PANEL, COMPREHENSIVE; Future    2. Erectile dysfunction due to arterial insufficiency    3. Acute idiopathic gout, unspecified site  -     allopurinoL (ZYLOPRIM) 100 mg tablet; TAKE ONE TABLET BY MOUTH DAILY  -     URIC ACID; Future    Other orders  -     hydroCHLOROthiazide (HYDRODIURIL) 25 mg tablet; Take 1 Tab by mouth daily. Reviewed low-sodium diet  Advised to monitor blood pressure and have blood pressure readings available for follow-up visit. I have discussed the diagnosis with the patient and the intended plan as seen in the above orders. The patient has received an after-visit summary and questions were answered concerning future plans. I have discussed medication side effects and warnings with the patient as well. Patient agreeable with above plan and verbalizes understanding. Follow-up and Dispositions    · Return in about 2 weeks (around 4/6/2021) for HTN, telephone follow up. Subjective:   Patient states he has been doing well. Comments he hasn't has any further gout flares. Patient requesting refill on Viagra. Patient states over the last couple of months he can feel pressure in his head after taking medication. States pressure last for 30 mins then resolved.     Lab Results   Component Value Date/Time    Sodium 137 05/27/2020 04:40 PM    Potassium 4.2 05/27/2020 04:40 PM    Chloride 107 05/27/2020 04:40 PM    CO2 26 05/27/2020 04:40 PM    Anion gap 4 05/27/2020 04:40 PM    Glucose 80 05/27/2020 04:40 PM    BUN 15 05/27/2020 04:40 PM    Creatinine 0.84 05/27/2020 04:40 PM    BUN/Creatinine ratio 18 05/27/2020 04:40 PM    GFR est AA >60 05/27/2020 04:40 PM    GFR est non-AA >60 05/27/2020 04:40 PM    Calcium 9.1 05/27/2020 04:40 PM    Bilirubin, total 0.5 05/27/2020 04:40 PM    Alk. phosphatase 92 05/27/2020 04:40 PM    Protein, total 7.7 05/27/2020 04:40 PM    Albumin 4.0 05/27/2020 04:40 PM    Globulin 3.7 05/27/2020 04:40 PM    A-G Ratio 1.1 05/27/2020 04:40 PM    ALT (SGPT) 41 05/27/2020 04:40 PM    AST (SGOT) 30 05/27/2020 04:40 PM     Lab Results   Component Value Date/Time    Cholesterol, total 205 (H) 05/27/2020 04:40 PM    HDL Cholesterol 62 (H) 05/27/2020 04:40 PM    LDL, calculated 120.2 (H) 05/27/2020 04:40 PM    VLDL, calculated 22.8 05/27/2020 04:40 PM    Triglyceride 114 05/27/2020 04:40 PM    CHOL/HDL Ratio 3.3 05/27/2020 04:40 PM     Lab Results   Component Value Date/Time    Hemoglobin A1c 5.5 05/27/2020 04:40 PM     No results found for: Ita Jeong, VD3RIA, [id number]    Lab Results   Component Value Date/Time    WBC 12.2 (H) 08/02/2018 10:28 AM    HGB 15.0 08/02/2018 10:28 AM    HCT 46.4 08/02/2018 10:28 AM    PLATELET 364 29/63/8600 10:28 AM    MCV 94.5 08/02/2018 10:28 AM       Wt Readings from Last 3 Encounters:   03/23/21 226 lb (102.5 kg)   03/10/21 225 lb (102.1 kg)   01/11/21 227 lb 3.2 oz (103.1 kg)     Temp Readings from Last 3 Encounters:   03/23/21 97 °F (36.1 °C) (Temporal)   03/10/21 97.5 °F (36.4 °C) (Temporal)   01/11/21 (!) 96.6 °F (35.9 °C) (Temporal)     BP Readings from Last 3 Encounters:   03/23/21 (!) 153/104   03/10/21 135/89   01/11/21 (!) 138/101     Pulse Readings from Last 3 Encounters:   03/23/21 96   03/10/21 95   01/11/21 78       Prior to Admission medications    Medication Sig Start Date End Date Taking? Authorizing Provider   meloxicam (Mobic) 15 mg tablet Take 1 Tab by mouth daily (with breakfast).  3/11/21  Yes Marilyn Remy MD   allopurinoL (ZYLOPRIM) 100 mg tablet TAKE ONE TABLET BY MOUTH DAILY 2/5/21  Yes Alfredo Santamaria NP   sildenafil citrate (VIAGRA) 100 mg tablet TAKE 1 TABLET BY MOUTH AS NEEDED FOR ERECTILE DYSFUNCTION 12/23/20  Yes Rachel Mcgraw NP   venlafaxine-SR [facility] 150 mg capsule Take 150 mg by mouth daily. 1/23/20  Yes Provider, Historical   pyridoxine HCl, vitamin B6, (VITAMIN B-6 PO) Take 500 mg by mouth daily. Yes Provider, Historical   clonazePAM (KLONOPIN) 2 mg tablet Take 2 mg by mouth three (3) times daily. Indications: PANIC DISORDER, QTY #90, Written on 8/18/16, Filled on 10/7/16. [facility]   Yes Ward Vale NP   alendronate (FOSAMAX) 70 mg tablet TAKE 1 TABLET BY MOUTH ONCE WEEKLY ON AN EMPTY STOMACH BEFORE BREAKFAST. REMAIN UPRIGHT FOR 30 MINUTES & TAKE WITH 8 OUNCES OF WATER 12/28/20   AISHA Castellon         The following sections were reviewed & updated as appropriate: PMH, PSH, FH, and SH. Review of Systems   Constitutional: Negative for activity change, appetite change, chills, fatigue and fever. Respiratory: Negative for chest tightness and shortness of breath. Cardiovascular: Negative for chest pain and leg swelling. Neurological: Negative for dizziness and headaches. Objective:     Visit Vitals  BP (!) 150/98 (BP 1 Location: Right upper arm, BP Patient Position: Sitting, BP Cuff Size: Adult)   Pulse 96   Temp 97 °F (36.1 °C) (Temporal)   Resp 18   Ht 6' (1.829 m)   Wt 226 lb (102.5 kg)   SpO2 98%   BMI 30.65 kg/m²      Physical Exam  Constitutional:       General: He is not in acute distress. Appearance: He is well-developed. He is not diaphoretic. HENT:      Head: Normocephalic and atraumatic. Neck:      Musculoskeletal: Normal range of motion and neck supple. Vascular: No carotid bruit. Cardiovascular:      Rate and Rhythm: Normal rate and regular rhythm. Heart sounds: Normal heart sounds. No murmur. No friction rub. No gallop. Pulmonary:      Effort: Pulmonary effort is normal.      Breath sounds: Normal breath sounds. No stridor. No wheezing or rales.    Abdominal:      General: Bowel sounds are normal.      Palpations: Abdomen is soft. Tenderness: There is no abdominal tenderness. Lymphadenopathy:      Cervical: No cervical adenopathy. Skin:     General: Skin is warm and dry. Neurological:      Mental Status: He is alert and oriented to person, place, and time. Disclaimer: The patient understands our medical plan. Alternatives have been explained and offered. The risks, benefits and significant side effects of all medications have been reviewed. Anticipated time course and progression of condition reviewed. All questions have been addressed. He is encouraged to employ the information provided in the after visit summary, which was reviewed. Where applicable, he is instructed to call the clinic if he has not been notified either by phone or through [address] with the results of his tests or with an appointment plan for any referrals within 1 week(s). No news is not good news; it's no news. The patient  is to call if his condition worsens or fails to improve or if significant side effects are experienced. Aspects of this note may have been generated using voice recognition software. Despite editing, there may be unrecognized errors.        Aurora Callahan NP

## 2021-03-30 ENCOUNTER — TELEPHONE (OUTPATIENT)
Dept: ORTHOPEDIC SURGERY | Age: 41
End: 2021-03-30

## 2021-03-30 NOTE — TELEPHONE ENCOUNTER
Patient called reporting his WC has denied his case stating they need further information stating why and how his injury is directly related to his work environment and activities. He is requesting a letter stating as much. Please advise if this is possible.      Patient [phone]

## 2021-03-30 NOTE — TELEPHONE ENCOUNTER
His last office note states no specific injury. We can give him a copy of his office note.  He can make an apt with [facility] to discuss further

## 2021-03-30 NOTE — TELEPHONE ENCOUNTER
Spoke to the patient and advised him to make appointment per [facility]. He was transferred to the [facility].

## 2021-03-31 ENCOUNTER — OFFICE VISIT (OUTPATIENT)
Dept: ORTHOPEDIC SURGERY | Age: 41
End: 2021-03-31
Payer: OTHER GOVERNMENT

## 2021-03-31 VITALS
TEMPERATURE: 97 F | OXYGEN SATURATION: 97 % | WEIGHT: 221 LBS | HEIGHT: 72 IN | RESPIRATION RATE: 15 BRPM | BODY MASS INDEX: 29.93 KG/M2 | HEART RATE: 93 BPM

## 2021-03-31 DIAGNOSIS — M22.2X2 PATELLOFEMORAL PAIN SYNDROME OF LEFT KNEE: ICD-10-CM

## 2021-03-31 DIAGNOSIS — M84.362D STRESS FRACTURE OF LEFT TIBIA WITH ROUTINE HEALING, SUBSEQUENT ENCOUNTER: Primary | ICD-10-CM

## 2021-03-31 PROCEDURE — 99212 OFFICE O/P EST SF 10 MIN: CPT | Performed by: ORTHOPAEDIC SURGERY

## 2021-03-31 NOTE — PROGRESS NOTES
Uday Cruz  1980   Chief Complaint   Patient presents with    Knee Pain     left        HISTORY OF PRESENT ILLNESS  Uday Cruz is a 36 y.o. male who presents today for reevaluation of left leg. Patient rates pain as 8/10 today. Pain has been present since 8/17/2020. 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 Pt went to Urgent Care, was given Lodine. Has tried using an MCL brace and taking Fosamax. Having pain with stairs, ladders. He is here today because Workmen's Comp has denied his claim, wants to discuss this. Patient denies any fever, chills, chest pain, shortness of breath or calf pain. The remainder of the review of systems is negative. There are no new illness or injuries to report since last seen in the office. There are no changes to medications, allergies, family or social history. Pain Assessment  3/31/2021   Location of Pain Knee   Location Modifiers Left   Severity of Pain 8   Quality of Pain Aching   Duration of Pain A few hours   Duration of Pain Comment -   Frequency of Pain Several times daily   Frequency of Pain Comment -   Aggravating Factors Bending;Stretching;Straightening;Exercise;Kneeling;Squatting;Standing;Stairs; Walking   Aggravating Factors Comment -   Limiting Behavior Yes   Relieving Factors Rest;Elevation   Result of Injury No   Work-Related Injury -   Type of Injury -   Type of Injury Comment -       PHYSICAL EXAM:   Visit Vitals  Pulse 93   Temp 97 °F (36.1 °C)   Resp 15   Ht 6' (1.829 m)   Wt 221 lb (100.2 kg)   SpO2 97%   BMI 29.97 kg/m²     The patient is a well-developed, well-nourished male   in no acute distress. The patient is alert and oriented times three. The patient is alert and oriented times three.  Mood and affect are normal.  LYMPHATIC: lymph nodes are not enlarged and are within normal limits  SKIN: normal in color and non tender to palpation. There are no bruises or abrasions noted. NEUROLOGICAL: Motor sensory exam is within normal limits. Reflexes are equal bilaterally. There is normal sensation to pinprick and light touch  MUSCULOSKELETAL:  Examination Left knee   Skin Intact   Range of motion 0-130   Effusion +   Medial joint line tenderness +   Lateral joint line tenderness -   Tenderness Pes Bursa -   Tenderness insertion MCL -   Tenderness insertion LCL -   Sapphires -   Patella crepitus -   Patella grind -   Lachman -   Pivot shift -   Anterior drawer -   Posterior drawer -   Varus stress -   Valgus stress -   Neurovascular Intact   Calf Swelling and Tenderness to Palpation -   Saman's Test -   Hamstring Cord Tightness -       IMAGING: XR of left knee with 2 views obtained in the office dated 3/10/2021 was reviewed and read by Dr. Nestora Sacks: no acute abnormalities        XR of left knee obtained in the office dated 10/12/2020 was reviewed and read by Dr. Nestora Sacks: Sclerotic rim over the medial tibial plateau. XR of left knee obtained in the office dated 9/29/2020 was reviewed and read by Dr. Nestora Sacks: Sclerotic changes of the proximal tibia on the medial side. MRI of left knee dated 9/08/2020 was reviewed and read by Dr. Nestora Sacks:   IMPRESSION:  1. Findings consistent with stress fracture, proximal medial tibial condyle, with surrounding local contusional marrow and minimal juxta osseous soft tissue edema. No gross arthritis. Additional small adjacent proximal tibial  intramedullary bone infarct. Top normal joint fluid volume and minimal popliteal cyst.  2. Intact menisci. 3. Intact cruciate ligaments. 4. Unremarkable anterior compartment.  Superficial anteromedial knee susceptibility artifact; no dedicated knee plain films, but no apparent abnormality on prior femur series, 2016; finding is probably from cutaneous micrometal      IMPRESSION:      ICD-10-CM ICD-9-CM 1. Stress fracture of left tibia with routine healing, subsequent encounter  M84.362D V54.89    2. Patellofemoral pain syndrome of left knee  M22.2X2 719.46         PLAN:   1. Pt presents today with left leg pain due to an MRI-documented stress fracture of the tibia. 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 To be clear, what we meant to say is that he did not have any new injuries. It is clear that this is an exacerbation of a pre-existing injury which was a work-related iniury. 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 This aggravated his preexisting condition which was a work-related injury which we have been taking care of since 9/09/2020. I would be glad to answer any further questions. Risk factors include: n/a  2. No ultrasound exam indicated today  3. No cortisone injection indicated today   4. No Physical/Occupational Therapy indicated today  5. No diagnostic test indicated today:   6. No durable medical equipment indicated today  7. No referral indicated today   8. No medications indicated today  9. No Narcotic indicated today       RTC prn      Scribed by [address]) as dictated by Dayanna Nunez MD    I, Dr. Dayanna Nunez, confirm that all documentation is accurate.     Dayanna Nunez M.D.   [facility]

## 2021-04-01 ENCOUNTER — TELEPHONE (OUTPATIENT)
Dept: ORTHOPEDIC SURGERY | Age: 41
End: 2021-04-01

## 2021-04-01 NOTE — LETTER
NOTIFICATION RETURN TO WORK / SCHOOL 
 
4/2/2021 8:06 AM 
 
Mr. Franceen Oppenheim 
[id number] [address] [address] To Whom It May Concern: 
 
Franceen Oppenheim is currently under the care of [address] [place] [address] Jamison Weber. 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 To be clear, what we meant to say is that he did not have any new injuries. It is clear that this is an exacerbation of a pre-existing injury which was a work-related iniury. 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 This aggravated his preexisting condition which was a work-related injury which we have been taking care of since 9/09/2020. If there are questions or concerns please have the patient contact our office. Sincerely, Cora Tavares MD

## 2021-04-01 NOTE — TELEPHONE ENCOUNTER
Patient called regarding the message from 03/30/21. He's asking if the worker's comp letter we were going to write for him can be uploaded to [address] so he can view it, or if it can be emailed to him. Please advise patient back regarding this at [phone].

## 2021-04-02 NOTE — TELEPHONE ENCOUNTER
Left message to return call. If patient calls back please let him know that work note has been entered and should be available through [employer].

## 2021-04-06 ENCOUNTER — VIRTUAL VISIT (OUTPATIENT)
Dept: FAMILY MEDICINE CLINIC | Age: 41
End: 2021-04-06
Payer: COMMERCIAL

## 2021-04-06 DIAGNOSIS — I82.4Y2 ACUTE DEEP VEIN THROMBOSIS (DVT) OF PROXIMAL VEIN OF LEFT LOWER EXTREMITY (HCC): ICD-10-CM

## 2021-04-06 DIAGNOSIS — I10 ESSENTIAL HYPERTENSION: Primary | ICD-10-CM

## 2021-04-06 PROCEDURE — 99442 PR PHYS/QHP TELEPHONE EVALUATION 11-20 MIN: CPT | Performed by: NURSE PRACTITIONER

## 2021-04-06 RX ORDER — TADALAFIL 10 MG/1
10 TABLET ORAL AS NEEDED
Qty: 30 TAB | Refills: 0 | Status: SHIPPED | OUTPATIENT
Start: 2021-04-06 | End: 2021-04-26

## 2021-04-06 RX ORDER — PYRIDOXINE HCL (VITAMIN B6) 250 MG
500 TABLET ORAL DAILY
Qty: 180 TAB | Refills: 2 | Status: SHIPPED | OUTPATIENT
Start: 2021-04-06

## 2021-04-06 NOTE — PROGRESS NOTES
Clark Heredia is a 36 y.o. male, evaluated via audio-only technology on 4/6/2021 for No chief complaint on file. .    Assessment & Plan:   Diagnoses and all orders for this visit:    1. Essential hypertension    2. Acute deep vein thrombosis (DVT) of proximal vein of left lower extremity (HCC)  Assessment & Plan:  Stable, based on history, physical exam and review of pertinent labs, studies and medications; meds reconciled; continue current treatment plan. Other orders  -     tadalafiL (Cialis) 10 mg tablet; Take 1 Tab by mouth as needed for Erectile Dysfunction. -     pyridoxine, vitamin B6, (Vitamin B-6) 250 mg tablet; Take 500 mg by mouth daily. Instructed to advise provider of any concerns once beginning cialis. Patient verbalizes understanding. Follow-up and Dispositions    · Return in about 3 months (around 7/6/2021) for HTN, in office follow up.     12  Subjective:   Hypertension ROS: taking medications as instructed, no medication side effects noted, home BP monitoring in range of 735'B systolic over 61'W diastolic, no TIA's, no chest pain on exertion, no dyspnea on exertion, no swelling of ankles. Patient states he feels like his mouth is dry since beginning HCTZ. Reports he has been attempting to adhere to low sodium diet. Denies headaches/head pressure. 119/69-wrist cuff yesterday    Prior to Admission medications    Medication Sig Start Date End Date Taking? Authorizing Provider   allopurinoL (ZYLOPRIM) 100 mg tablet TAKE ONE TABLET BY MOUTH DAILY 3/23/21   Amanda LEOS NP   hydroCHLOROthiazide (HYDRODIURIL) 25 mg tablet Take 1 Tab by mouth daily. 3/23/21   Amanda LEOS NP   meloxicam (Mobic) 15 mg tablet Take 1 Tab by mouth daily (with breakfast).  3/11/21   Uzair Sales MD   sildenafil citrate (VIAGRA) 100 mg tablet TAKE 1 TABLET BY MOUTH AS NEEDED FOR ERECTILE DYSFUNCTION 12/23/20   Amanda LEOS NP   venlafaxine-SR [facility] 150 mg capsule Take 150 mg by mouth daily. 1/23/20   Provider, Historical   pyridoxine HCl, vitamin B6, (VITAMIN B-6 PO) Take 500 mg by mouth daily. Provider, Historical   clonazePAM (KLONOPIN) 2 mg tablet Take 2 mg by mouth three (3) times daily. Indications: PANIC DISORDER, QTY #90, Written on 8/18/16, Filled on 10/7/16. Prescriber Psychiatry [place] [id number] Lady Gregory NP     Patient Active Problem List   Diagnosis Code    Poison sumac L23.7    Depression F32.9    Anxiety F41.9    Hip pain, chronic M25.559, G89.29    Avascular necrosis (HCC) M87.00    Tobacco abuse Z72.0     Patient Active Problem List    Diagnosis Date Noted    Depression     Anxiety     Hip pain, chronic     Avascular necrosis (HCC)     Tobacco abuse     Poison sumac 09/06/2011     Current Outpatient Medications   Medication Sig Dispense Refill    allopurinoL (ZYLOPRIM) 100 mg tablet TAKE ONE TABLET BY MOUTH DAILY 90 Tab 0    hydroCHLOROthiazide (HYDRODIURIL) 25 mg tablet Take 1 Tab by mouth daily. 30 Tab 2    meloxicam (Mobic) 15 mg tablet Take 1 Tab by mouth daily (with breakfast). 30 Tab 1    sildenafil citrate (VIAGRA) 100 mg tablet TAKE 1 TABLET BY MOUTH AS NEEDED FOR ERECTILE DYSFUNCTION 30 Tab 2    venlafaxine-SR (EFFEXOR-XR) 150 mg capsule Take 150 mg by mouth daily.  pyridoxine HCl, vitamin B6, (VITAMIN B-6 PO) Take 500 mg by mouth daily.  clonazePAM (KLONOPIN) 2 mg tablet Take 2 mg by mouth three (3) times daily. Indications: PANIC DISORDER, QTY #90, Written on 8/18/16, Filled on 10/7/16. Prescriber Psychiatry Vagetachew  Víctor       No Known Allergies  Past Medical History:   Diagnosis Date    Anxiety     Psychiatry Lois Bowser Livers Arthritis     Avascular necrosis ([address]     Kuldeep Pitt    [facility] Depression     Psychiatry Lois Bowser Livers Gout     Hip pain, chronic     [facility] Dr. Elena Pitt     Tobacco abuse      Past Surgical History:   Procedure Laterality Date    HX HIP REPLACEMENT Left 2015    [facility] SILVA HIP REPLACEMENT Right 2015    [facility] SILVA WISDOM TEETH EXTRACTION       Family History   Problem Relation Age of Onset    Anxiety Mother     Heart Attack Father     Diabetes Brother     Heart Disease Paternal Grandmother     Diabetes Paternal Grandmother     Heart Disease Paternal Grandfather      Social History     Tobacco Use    Smoking status: Former Smoker     Packs/day: 0.50     Years: 20.00     Pack years: 10.00     Types: Cigarettes     Quit date: 2020     Years since quittin.5    Smokeless tobacco: Never Used   Substance Use Topics    Alcohol use: Yes     Comment: 1 case of beer a week       ROS    Patient-Reported Vitals 3/30/2021   Patient-Reported Weight 215   Patient-Reported Height 6'   Patient-Reported Pulse 102   Patient-Reported Temperature 98.4   Patient-Reported Systolic  382   Patient-Reported Diastolic 77        Ben Jarvis, who was evaluated through a patient-initiated, synchronous (real-time) audio only encounter, and/or her healthcare decision maker, is aware that it is a billable service, with coverage as determined by his insurance carrier. He provided verbal consent to proceed: Yes. He has not had a related appointment within my department in the past 7 days or scheduled within the next 24 hours.       Total Time: minutes: 11-20 minutes    Betina Grover NP

## 2021-04-07 ENCOUNTER — OFFICE VISIT (OUTPATIENT)
Dept: ORTHOPEDIC SURGERY | Age: 41
End: 2021-04-07
Payer: COMMERCIAL

## 2021-04-07 VITALS
BODY MASS INDEX: 29.93 KG/M2 | HEIGHT: 72 IN | OXYGEN SATURATION: 98 % | RESPIRATION RATE: 16 BRPM | HEART RATE: 99 BPM | WEIGHT: 221 LBS

## 2021-04-07 DIAGNOSIS — M22.2X2 PATELLOFEMORAL PAIN SYNDROME OF LEFT KNEE: ICD-10-CM

## 2021-04-07 DIAGNOSIS — M84.362D STRESS FRACTURE OF LEFT TIBIA WITH ROUTINE HEALING, SUBSEQUENT ENCOUNTER: Primary | ICD-10-CM

## 2021-04-07 PROCEDURE — 99213 OFFICE O/P EST LOW 20 MIN: CPT | Performed by: ORTHOPAEDIC SURGERY

## 2021-04-07 NOTE — PROGRESS NOTES
Heidy Mendez  1980   Chief Complaint   Patient presents with    Leg Pain     Left leg        HISTORY OF PRESENT ILLNESS  Heidy Mendez is a 36 y.o. male who presents today for reevaluation of left leg. Patient rates pain as 3/10 today. Pain has been present since 8/17/2020. 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 Pt went to Urgent Care, was given Lodine. Has tried using an MCL brace and taking Fosamax. Pain has improved but he is still limited with lifting at work. He states [employer] has closed his case and he is waiting for them to open it back up. Patient denies any fever, chills, chest pain, shortness of breath or calf pain. The remainder of the review of systems is negative. There are no new illness or injuries to report since last seen in the office. There are no changes to medications, allergies, family or social history. Pain Assessment  4/7/2021   Location of Pain Leg   Location Modifiers Left   Severity of Pain 3   Quality of Pain Aching   Duration of Pain Persistent   Duration of Pain Comment -   Frequency of Pain Constant   Frequency of Pain Comment -   Aggravating Factors Stretching;Bending;Walking   Aggravating Factors Comment -   Limiting Behavior Yes   Relieving Factors Rest   Result of Injury -   Work-Related Injury -   Type of Injury -   Type of Injury Comment -       PHYSICAL EXAM:   Visit Vitals  Pulse 99   Resp 16   Ht 6' (1.829 m)   Wt 221 lb (100.2 kg)   SpO2 98%   BMI 29.97 kg/m²     The patient is a well-developed, well-nourished male   in no acute distress. The patient is alert and oriented times three. The patient is alert and oriented times three. Mood and affect are normal.  LYMPHATIC: lymph nodes are not enlarged and are within normal limits  SKIN: normal in color and non tender to palpation.  There are no bruises or abrasions noted. NEUROLOGICAL: Motor sensory exam is within normal limits. Reflexes are equal bilaterally. There is normal sensation to pinprick and light touch  MUSCULOSKELETAL:  Examination Left knee   Skin Intact   Range of motion 0-130   Effusion +   Medial joint line tenderness +   Lateral joint line tenderness -   Tenderness Pes Bursa -   Tenderness insertion MCL -   Tenderness insertion LCL -   Sapphires -   Patella crepitus -   Patella grind -   Lachman -   Pivot shift -   Anterior drawer -   Posterior drawer -   Varus stress -   Valgus stress -   Neurovascular Intact   Calf Swelling and Tenderness to Palpation -   Saman's Test -   Hamstring Cord Tightness -       IMAGING: XR of left knee with 2 views obtained in the office dated 3/10/2021 was reviewed and read by Dr. Marlyn Her: no acute abnormalities        XR of left knee obtained in the office dated 10/12/2020 was reviewed and read by Dr. Marlyn Her: Sclerotic rim over the medial tibial plateau. XR of left knee obtained in the office dated 9/29/2020 was reviewed and read by Dr. Marlyn Her: Sclerotic changes of the proximal tibia on the medial side. MRI of left knee dated 9/08/2020 was reviewed and read by Dr. Marlyn Her:   IMPRESSION:  1. Findings consistent with stress fracture, proximal medial tibial condyle, with surrounding local contusional marrow and minimal juxta osseous soft tissue edema. No gross arthritis. Additional small adjacent proximal tibial  intramedullary bone infarct. Top normal joint fluid volume and minimal popliteal cyst.  2. Intact menisci. 3. Intact cruciate ligaments. 4. Unremarkable anterior compartment. Superficial anteromedial knee susceptibility artifact; no dedicated knee plain films, but no apparent abnormality on prior femur series, 2016; finding is probably from cutaneous micrometal      IMPRESSION:      ICD-10-CM ICD-9-CM    1.  Stress fracture of left tibia with routine healing, subsequent encounter  M84.362D V54.89    2. Patellofemoral pain syndrome of left knee  M22.2X2 719.46         PLAN:   1. Pt presents today with left leg pain due to an MRI-documented stress fracture of the tibia. I explained to the patient that he can continue with the same work restrictions for one more month. Will see him back in 4 weeks for reevaluation at which time we may need to order an FCE/IR. Risk factors include: n/a  2. No ultrasound exam indicated today  3. No cortisone injection indicated today   4. No Physical/Occupational Therapy indicated today  5. No diagnostic test indicated today:   6. No durable medical equipment indicated today  7. No referral indicated today   8. No medications indicated today  9. No Narcotic indicated today       RTC 4 weeks      Scribed by Elenita Jason [address]) as dictated by Mandy Jimenez MD    I, Dr. Mandy Jimenez, confirm that all documentation is accurate.     Mandy Jimenez M.D.   [facility]

## 2021-04-26 ENCOUNTER — APPOINTMENT (OUTPATIENT)
Dept: CT IMAGING | Age: 41
End: 2021-04-26
Attending: PHYSICIAN ASSISTANT
Payer: COMMERCIAL

## 2021-04-26 ENCOUNTER — HOSPITAL ENCOUNTER (EMERGENCY)
Age: 41
Discharge: HOME | End: 2021-04-26
Attending: EMERGENCY MEDICINE
Payer: COMMERCIAL

## 2021-04-26 VITALS
OXYGEN SATURATION: 100 % | SYSTOLIC BLOOD PRESSURE: 168 MMHG | WEIGHT: 209 LBS | DIASTOLIC BLOOD PRESSURE: 118 MMHG | HEIGHT: 72 IN | RESPIRATION RATE: 18 BRPM | HEART RATE: 75 BPM | BODY MASS INDEX: 28.31 KG/M2 | TEMPERATURE: 98.6 F

## 2021-04-26 DIAGNOSIS — N20.0 RENAL CALCULUS, BILATERAL: Primary | ICD-10-CM

## 2021-04-26 LAB
ANION GAP SERPL CALC-SCNC: 5 MMOL/L (ref 3–18)
APPEARANCE UR: CLEAR
BASOPHILS # BLD: 0.2 K/UL (ref 0–0.1)
BASOPHILS NFR BLD: 1 % (ref 0–2)
BILIRUB UR QL: NEGATIVE
BUN SERPL-MCNC: 11 MG/DL (ref 7–18)
BUN/CREAT SERPL: 14 (ref 12–20)
CALCIUM SERPL-MCNC: 9.3 MG/DL (ref 8.5–10.1)
CHLORIDE SERPL-SCNC: 108 MMOL/L (ref 100–111)
CO2 SERPL-SCNC: 27 MMOL/L (ref 21–32)
COLOR UR: YELLOW
CREAT SERPL-MCNC: 0.81 MG/DL (ref 0.6–1.3)
DIFFERENTIAL METHOD BLD: ABNORMAL
EOSINOPHIL # BLD: 0.5 K/UL (ref 0–0.4)
EOSINOPHIL NFR BLD: 4 % (ref 0–5)
ERYTHROCYTE [DISTWIDTH] IN BLOOD BY AUTOMATED COUNT: 12.7 % (ref 11.6–14.5)
GLUCOSE SERPL-MCNC: 102 MG/DL (ref 74–99)
GLUCOSE UR STRIP.AUTO-MCNC: NEGATIVE MG/DL
HCT VFR BLD AUTO: 43.9 % (ref 36–48)
HGB BLD-MCNC: 14.6 G/DL (ref 13–16)
HGB UR QL STRIP: NEGATIVE
KETONES UR QL STRIP.AUTO: NEGATIVE MG/DL
LEUKOCYTE ESTERASE UR QL STRIP.AUTO: NEGATIVE
LYMPHOCYTES # BLD: 2.3 K/UL (ref 0.9–3.6)
LYMPHOCYTES NFR BLD: 19 % (ref 21–52)
MCH RBC QN AUTO: 30.3 PG (ref 24–34)
MCHC RBC AUTO-ENTMCNC: 33.3 G/DL (ref 31–37)
MCV RBC AUTO: 91.1 FL (ref 74–97)
MONOCYTES # BLD: 0.9 K/UL (ref 0.05–1.2)
MONOCYTES NFR BLD: 8 % (ref 3–10)
NEUTS SEG # BLD: 8.1 K/UL (ref 1.8–8)
NEUTS SEG NFR BLD: 66 % (ref 40–73)
NITRITE UR QL STRIP.AUTO: NEGATIVE
PH UR STRIP: 5 [PH] (ref 5–8)
PLATELET # BLD AUTO: 293 K/UL (ref 135–420)
PMV BLD AUTO: 8.9 FL (ref 9.2–11.8)
POTASSIUM SERPL-SCNC: 4.4 MMOL/L (ref 3.5–5.5)
PROT UR STRIP-MCNC: NEGATIVE MG/DL
RBC # BLD AUTO: 4.82 M/UL (ref 4.35–5.65)
SODIUM SERPL-SCNC: 140 MMOL/L (ref 136–145)
SP GR UR REFRACTOMETRY: 1 (ref 1–1.03)
UROBILINOGEN UR QL STRIP.AUTO: 0.2 EU/DL (ref 0.2–1)
WBC # BLD AUTO: 12.3 K/UL (ref 4.6–13.2)

## 2021-04-26 PROCEDURE — 74011250636 HC RX REV CODE- 250/636: Performed by: PHYSICIAN ASSISTANT

## 2021-04-26 PROCEDURE — 99283 EMERGENCY DEPT VISIT LOW MDM: CPT

## 2021-04-26 PROCEDURE — 74011250637 HC RX REV CODE- 250/637: Performed by: PHYSICIAN ASSISTANT

## 2021-04-26 PROCEDURE — 80048 BASIC METABOLIC PNL TOTAL CA: CPT

## 2021-04-26 PROCEDURE — 96374 THER/PROPH/DIAG INJ IV PUSH: CPT

## 2021-04-26 PROCEDURE — 81003 URINALYSIS AUTO W/O SCOPE: CPT

## 2021-04-26 PROCEDURE — 74176 CT ABD & PELVIS W/O CONTRAST: CPT

## 2021-04-26 PROCEDURE — 85025 COMPLETE CBC W/AUTO DIFF WBC: CPT

## 2021-04-26 RX ORDER — HYDROCODONE BITARTRATE AND ACETAMINOPHEN 5; 325 MG/1; MG/1
1 TABLET ORAL
Status: COMPLETED | OUTPATIENT
Start: 2021-04-26 | End: 2021-04-26

## 2021-04-26 RX ORDER — TAMSULOSIN HYDROCHLORIDE 0.4 MG/1
0.4 CAPSULE ORAL DAILY
Qty: 15 CAP | Refills: 0 | Status: SHIPPED | OUTPATIENT
Start: 2021-04-26 | End: 2021-05-11

## 2021-04-26 RX ORDER — KETOROLAC TROMETHAMINE 15 MG/ML
15 INJECTION, SOLUTION INTRAMUSCULAR; INTRAVENOUS
Status: COMPLETED | OUTPATIENT
Start: 2021-04-26 | End: 2021-04-26

## 2021-04-26 RX ORDER — HYDROCODONE BITARTRATE AND ACETAMINOPHEN 5; 325 MG/1; MG/1
1 TABLET ORAL
Qty: 6 TAB | Refills: 0 | Status: SHIPPED | OUTPATIENT
Start: 2021-04-26 | End: 2021-05-01

## 2021-04-26 RX ORDER — LOPERAMIDE HYDROCHLORIDE 2 MG/1
2 CAPSULE ORAL
Qty: 20 CAP | Refills: 0 | Status: SHIPPED | OUTPATIENT
Start: 2021-04-26 | End: 2021-05-06

## 2021-04-26 RX ORDER — NAPROXEN 500 MG/1
500 TABLET ORAL 2 TIMES DAILY WITH MEALS
Qty: 20 TAB | Refills: 0 | Status: SHIPPED
Start: 2021-04-26 | End: 2022-02-21 | Stop reason: SINTOL

## 2021-04-26 RX ADMIN — KETOROLAC TROMETHAMINE 15 MG: 15 INJECTION, SOLUTION INTRAMUSCULAR; INTRAVENOUS at 11:08

## 2021-04-26 RX ADMIN — HYDROCODONE BITARTRATE AND ACETAMINOPHEN 1 TABLET: 5; 325 TABLET ORAL at 12:28

## 2021-04-26 NOTE — Clinical Note
[address] [place] [address] Dr [facility] EMERGENCY DEPT 
[address] [facility] [address] [phone] Work/School Note Date: 4/26/2021 To Whom It May concern: 
 
 
Jose Cruz Alvarez was seen and treated today in the emergency room by the following provider(s): 
Attending Provider: Valentín Barnes MD 
Physician Assistant: AISHA Coe. Jose Cruz Alvarez is excused from work/school on 04/26/21. He is clear to return to work/school on 04/27/21. Sincerely, AISHA Marcial

## 2021-04-26 NOTE — ED PROVIDER NOTES
EMERGENCY DEPARTMENT HISTORY AND PHYSICAL EXAM      Date: 4/26/2021  Patient Name: Burak Ren    History of Presenting Illness     Chief Complaint   Patient presents with    Back Pain       History Provided By: Patient    HPI: Burak Ren, 36 y.o. male PMHx significant for anxiety, avascular necrosis, depression, gout presents ambulatory to the ED with cc of intermittent sharp left flank and left low back pain that began today. Denies dysuria, hematuria, vomiting, fever/chills. Denies history of kidney stones. Patient also reports intermittent nonbloody diarrhea x1 week. Denies abdominal pain. Patient does not take anything for symptoms. Denies previous abdominal surgeries. Denies recent trauma or injury. Denies numbness/tingling, radiating pain, saddle anesthesia, loss of bowel or bladder function, IV drug use. There are no other complaints, changes, or physical findings at this time. PCP: Anca Cheatham NP    No current facility-administered medications on file prior to encounter. Current Outpatient Medications on File Prior to Encounter   Medication Sig Dispense Refill    pyridoxine, vitamin B6, (Vitamin B-6) 250 mg tablet Take 500 mg by mouth daily. 180 Tab 2    allopurinoL (ZYLOPRIM) 100 mg tablet TAKE ONE TABLET BY MOUTH DAILY 90 Tab 0    hydroCHLOROthiazide (HYDRODIURIL) 25 mg tablet Take 1 Tab by mouth daily. 30 Tab 2    meloxicam (Mobic) 15 mg tablet Take 1 Tab by mouth daily (with breakfast). 30 Tab 1    venlafaxine-SR (EFFEXOR-XR) 150 mg capsule Take 150 mg by mouth daily.  pyridoxine HCl, vitamin B6, (VITAMIN B-6 PO) Take 500 mg by mouth daily.  clonazePAM (KLONOPIN) 2 mg tablet Take 2 mg by mouth three (3) times daily. Indications: PANIC DISORDER, QTY #90, Written on 8/18/16, Filled on 10/7/16. Prescriber Psychiatry Eileen [address] Arthur[address] Song [DISCONTINUED] tadalafiL (Cialis) 10 mg tablet Take 1 Tab by mouth as needed for Erectile Dysfunction.  27 Tab 0       Past History     Past Medical History:  Past Medical History:   Diagnosis Date    Anxiety     Psychiatry Lois Mosley Must Arthritis     Avascular necrosis ([facility] [address]     [facility] Dr. Cuca Cortez    [facility] Depression     Psychiatry Lois Mosley Must Gout     Hip pain, chronic     [facility] Dr. Cuca Cortez     Tobacco abuse        Past Surgical History:  Past Surgical History:   Procedure Laterality Date    HX HIP REPLACEMENT Left 2015    [facility] Dr. Felix Kahn Right 2015    [facility] Dr. Cuca Cortez     HX WISDOM TEETH EXTRACTION         Family History:  Family History   Problem Relation Age of Onset    Anxiety Mother     Heart Attack Father     Diabetes Brother     Heart Disease Paternal Grandmother     Diabetes Paternal Grandmother     Heart Disease Paternal Grandfather        Social History:  Social History     Tobacco Use    Smoking status: Former Smoker     Packs/day: 0.50     Years: 20.00     Pack years: 10.00     Types: Cigarettes     Quit date: 2020     Years since quittin.6    Smokeless tobacco: Never Used   Substance Use Topics    Alcohol use: Yes     Comment: 1 case of beer a week    Drug use: No       Allergies:  No Known Allergies      Review of Systems   Review of Systems   Constitutional: Negative for chills and fever. HENT: Negative for facial swelling. Eyes: Negative for photophobia and visual disturbance. Respiratory: Negative for shortness of breath. Cardiovascular: Negative for chest pain. Gastrointestinal: Negative for abdominal pain, nausea and vomiting. Genitourinary: Positive for flank pain. Musculoskeletal: Positive for back pain. Skin: Negative for color change, pallor, rash and wound. Neurological: Negative for dizziness, weakness, light-headedness and headaches. All other systems reviewed and are negative. Physical Exam   Physical Exam  Vitals signs and nursing note reviewed. Constitutional:       General: He is not in acute distress. Appearance: He is well-developed. Comments: Patient well-appearing in NAD   HENT:      Head: Normocephalic and atraumatic. Eyes:      Conjunctiva/sclera: Conjunctivae normal.   Cardiovascular:      Rate and Rhythm: Normal rate and regular rhythm. Heart sounds: Normal heart sounds. Pulmonary:      Effort: Pulmonary effort is normal. No respiratory distress. Breath sounds: Normal breath sounds. Comments: Lungs CTA  Not working to breathe  Abdominal:      General: Bowel sounds are normal.      Palpations: Abdomen is soft. Tenderness: There is no abdominal tenderness. Comments: Abdomen soft, nontender  Nondistended  No guarding or rigidity  Left CVA tenderness   Musculoskeletal: Normal range of motion. Comments: DP pulses strong and bilaterally   Skin:     General: Skin is warm. Findings: No rash. Neurological:      Mental Status: He is alert and oriented to person, place, and time. Cranial Nerves: No cranial nerve deficit.    Psychiatric:         Behavior: Behavior normal.         Diagnostic Study Results     Labs -     Recent Results (from the past 12 hour(s))   URINALYSIS W/ RFLX MICROSCOPIC    Collection Time: 04/26/21  9:55 AM   Result Value Ref Range    Color YELLOW      Appearance CLEAR      Specific gravity 1.005 1.005 - 1.030      pH (UA) 5.0 5.0 - 8.0      Protein Negative NEG mg/dL    Glucose Negative NEG mg/dL    Ketone Negative NEG mg/dL    Bilirubin Negative NEG      Blood Negative NEG      Urobilinogen 0.2 0.2 - 1.0 EU/dL    Nitrites Negative NEG      Leukocyte Esterase Negative NEG     CBC WITH AUTOMATED DIFF    Collection Time: 04/26/21 10:00 AM   Result Value Ref Range    WBC 12.3 4.6 - 13.2 K/uL    RBC 4.82 4.35 - 5.65 M/uL    HGB 14.6 13.0 - 16.0 g/dL    HCT 43.9 36.0 - 48.0 %    MCV 91.1 74.0 - 97.0 FL    MCH 30.3 24.0 - 34.0 PG    MCHC 33.3 31.0 - 37.0 g/dL    RDW 12.7 11.6 - 14.5 % PLATELET 064 381 - 380 K/uL    MPV 8.9 (L) 9.2 - 11.8 FL    NEUTROPHILS 66 40 - 73 %    LYMPHOCYTES 19 (L) 21 - 52 %    MONOCYTES 8 3 - 10 %    EOSINOPHILS 4 0 - 5 %    BASOPHILS 1 0 - 2 %    ABS. NEUTROPHILS 8.1 (H) 1.8 - 8.0 K/UL    ABS. LYMPHOCYTES 2.3 0.9 - 3.6 K/UL    ABS. MONOCYTES 0.9 0.05 - 1.2 K/UL    ABS. EOSINOPHILS 0.5 (H) 0.0 - 0.4 K/UL    ABS. BASOPHILS 0.2 (H) 0.0 - 0.1 K/UL    DF AUTOMATED     METABOLIC PANEL, BASIC    Collection Time: 04/26/21 10:00 AM   Result Value Ref Range    Sodium 140 136 - 145 mmol/L    Potassium 4.4 3.5 - 5.5 mmol/L    Chloride 108 100 - 111 mmol/L    CO2 27 21 - 32 mmol/L    Anion gap 5 3.0 - 18 mmol/L    Glucose 102 (H) 74 - 99 mg/dL    BUN 11 7.0 - 18 MG/DL    Creatinine 0.81 0.6 - 1.3 MG/DL    BUN/Creatinine ratio 14 12 - 20      GFR est AA >60 >60 ml/min/1.73m2    GFR est non-AA >60 >60 ml/min/1.73m2    Calcium 9.3 8.5 - 10.1 MG/DL       Radiologic Studies -   CT ABD PELV WO CONT   Final Result      1. Bilateral nonobstructive renal calculi. No hydronephrosis. 2. The distal bilateral ureters, bladder and prostate are markedly obscured by   bilateral hip prosthesis. 3. Mild hepatic steatosis. No other significant findings seen. Thank you for this referral.        CT Results  (Last 48 hours)               04/26/21 1152  CT ABD PELV WO CONT Final result    Impression:      1. Bilateral nonobstructive renal calculi. No hydronephrosis. 2. The distal bilateral ureters, bladder and prostate are markedly obscured by   bilateral hip prosthesis. 3. Mild hepatic steatosis. No other significant findings seen. Thank you for this referral.       Narrative:  CT abdomen and pelvis for stone study        HISTORY: Left flank pain and lower back pain       COMPARISON: None. TECHNIQUE: Helical scan to the abdomen and pelvis is obtained without oral or IV   contrast administration per stone protocol.         All CT scans at this facility performed using dose optimization techniques as   appreciated to a performed exam, to include automated exposure control,   adjustment of the mA and or KU according to patient size (including appropriate   matching for site specific examination), or use of iterative reconstruction   technique. FINDINGS: Imaging portion to the lung bases appear clear. KIDNEYS, URETERS AND BLADDER:        The bilateral kidneys appear normal in size and contour. 3 x 4 mm calculus seen   in the midpole of right kidney and 2 mm nonobstructive calculus in the lower   pole of left kidney. No evidence of obstructing calculi or hydronephrosis    identified. The distal bilateral ureters are obscured by significant streak artifact from   bilateral hip prosthesis. The visualized proximal and mid portion ureters are   nondilated with no ureteral calculi present. The bladder is also significantly   obscured, especially at inferior aspect. Poor distention noted. CT ABDOMEN AND PELVIS:        The liver shows moderate fatty infiltration. The gallbladder is mildly distended   with no calculus or inflammation. No biliary dilatation. The spleen, pancreas,   gallbladder and both adrenal glands appear grossly normal on this noncontrast   study. The appendix appears normal.The small and large bowel appear nondilated. The bladder is completely obscured by bilateral hip prosthesis. No   retroperitoneal adenopathy identified. Abdominal aorta appears unremarkable for   age. No pelvic free fluid identified. .        CT OSSEOUS STRUCTURES:        Bilateral hip replacement surgeries are noted. .               CXR Results  (Last 48 hours)    None          Medical Decision Making   I am the first provider for this patient. I reviewed the vital signs, available nursing notes, past medical history, past surgical history, family history and social history. Vital Signs-Reviewed the patient's vital signs.   Patient Vitals for the past 12 hrs:   Temp Pulse Resp BP SpO2   04/26/21 0948 98.6 °F (37 °C) 75 18 (!) 168/118 100 %       Records Reviewed: Nursing Notes and Old Medical Records    Provider Notes (Medical Decision Making):   DDx: Kidney stone, Muscle strain, Muscle spasm, UTI, Pyelo    37 yo M who presents with intermittent aching left flank pain and back pain that began this am.  On exam left CVA tenderness. No abdominal tenderness. UA shows no WBC in urine. No leukocytosis or left shift. CT scan shows b/l renal calculi. No hydronephrosis with normal creatinine. Will treat patient symptomatically for kidney stone and discussed importance of urology follow-up. At time of discharge patient nontoxic-appearing in NAD. Patient stable for prompt outpatient follow-up with the [facility] 2 days. Patient given strict instructions to return if symptoms worsen. ED Course:   Initial assessment performed. The patients presenting problems have been discussed, and they are in agreement with the care plan formulated and outlined with them. I have encouraged them to ask questions as they arise throughout their visit. Disposition:  1:13 PM  Discussed lab and imaging results with pt along with dx and treatment plan. Discussed importance of PCP follow up. All questions answered. Pt voiced they understood. Return if sx worsen. PLAN:  1. Current Discharge Medication List      START taking these medications    Details   HYDROcodone-acetaminophen (Norco) 5-325 mg per tablet Take 1 Tab by mouth every six (6) hours as needed for Pain for up to 5 days. Max Daily Amount: 4 Tabs. Qty: 6 Tab, Refills: 0    Associated Diagnoses: Renal calculus, bilateral      tamsulosin (Flomax) 0.4 mg capsule Take 1 Cap by mouth daily for 15 days. Qty: 15 Cap, Refills: 0      naproxen (NAPROSYN) 500 mg tablet Take 1 Tab by mouth two (2) times daily (with meals).   Qty: 20 Tab, Refills: 0      loperamide (IMODIUM) 2 mg capsule Take 1 Cap by mouth four (4) times daily as needed for Diarrhea for up to 10 days. Qty: 20 Cap, Refills: 0           2. Follow-up Information     Follow up With Specialties Details Why Contact Info    Lillie Patel NP Nurse Practitioner Schedule an appointment as soon as possible for a visit in 1 day  [address]  [address] [place]  [address]  [phone]      Coretta Haas MD Urology Schedule an appointment as soon as possible for a visit in 1 day  [address] [facility] [address] [facility] EMERGENCY DEPT Emergency Medicine  As needed, If symptoms worsen [address]  [phone]        Return to ED if worse     Diagnosis     Clinical Impression:   1. Renal calculus, bilateral        Attestations:    AISHA Baez    Please note that this dictation was completed with [employer], the computer voice recognition software. Quite often unanticipated grammatical, syntax, homophones, and other interpretive errors are inadvertently transcribed by the computer software. Please disregard these errors. Please excuse any errors that have escaped final proofreading. Thank you.

## 2021-04-29 NOTE — PROGRESS NOTES
[facility]  [address]  [place], [address]  [phone] fax    Physical Therapy Discharge Summary  Patient name: Doreen Croft Start of Care: 3/18/2021   Referral source: Merle Vivas,* : 1980   Medical/Treatment Diagnosis: Left knee pain [M25.562]  Pain in left leg [M79.605]  Payor: DEPARTMENT OF LABOR / Plan: [employer]MENT [employer] / Product Type: Workers Comp /  Onset Date:2-3 weeks prior to IE     Prior Hospitalization: see medical history Provider#: [id number]   Medications: Verified on Patient Summary List    Comorbidities: anxiety or panic disorders, arthritis, prior surgery, prosthesis/implants    Prior Level of Function: limited to 20# lifting restriction with work, able to tolerate mobility around carrier without left knee pain since return to work from tibia fracture   Visits from Start of Care: 1    Missed Visits: 0  Reporting Period : 3/18/2021 to 3/18/2021      Summary of Care:  Short Term Goals: To be accomplished in 2 weeks:  1. Patient will report performance of home exercise program 4 of 7 days in the next week demonstrating compliance to therapy program and progress towards independent management of symptoms. Eval: HEP provided      Long Term Goals: To be accomplished in 6 weeks:  1. Patient will demonstrate 10 squats with equal weight through bilateral LE and no knee valgus present to improve transfers and mobility with greater ease. Eval: 3 squats with weight shift to the right and slight valgus noted on left knee   2. Patient will increase left glute med and max strength to 5-/5 to improve stair negotiation and ambulation. Eval: glute med 4+/5, glute max 4-/5  3. Patient will report no difficulty with negotiation of the carrier in regards to left knee pain to promote return to full work duty. Eval: moderate to a lot of difficulty   4.  Patient will report <4/10 pain at worst to improve functional mobility and greater ease with ambulation. Eval: 7-8/10 after full day  5. Patient will improve FOTO score to 76 to demonstrate return to prior level of function and to improve patients ability to perform usual household and work duties with greater ease. Eval: 54      ASSESSMENT/RECOMMENDATIONS: Unable to further assess progress towards goals at this time due to non-compliance/lack of attendance. DC at this time with no further instructions to the patient.   Thank you for this referral.    [x]Discontinue therapy: []Patient has reached or is progressing toward set goals      [x]Patient is non-compliant or has abdicated      []Due to lack of appreciable progress towards set goals    Stephanie Man, PT, DPT 4/29/2021 12:00 PM

## 2021-05-06 ENCOUNTER — VIRTUAL VISIT (OUTPATIENT)
Dept: FAMILY MEDICINE CLINIC | Age: 41
End: 2021-05-06
Payer: COMMERCIAL

## 2021-05-06 DIAGNOSIS — N20.0 RENAL CALCULUS, BILATERAL: Primary | ICD-10-CM

## 2021-05-06 DIAGNOSIS — K62.5 RECTAL BLEEDING: ICD-10-CM

## 2021-05-06 PROCEDURE — 99213 OFFICE O/P EST LOW 20 MIN: CPT | Performed by: NURSE PRACTITIONER

## 2021-05-06 RX ORDER — OXYCODONE AND ACETAMINOPHEN 7.5; 325 MG/1; MG/1
1 TABLET ORAL
Qty: 12 TAB | Refills: 0 | Status: SHIPPED | OUTPATIENT
Start: 2021-05-06 | End: 2021-05-09

## 2021-05-06 NOTE — PROGRESS NOTES
Naomi Rosales is a 36 y.o. male who was seen by synchronous (real-time) audio-video technology on 5/6/2021 for No chief complaint on file. Assessment & Plan:   Diagnoses and all orders for this visit:    1. Renal calculus, bilateral  -     REFERRAL TO UROLOGY  -     oxyCODONE-acetaminophen (PERCOCET 7.5) 7.5-325 mg per tablet; Take 1 Tab by mouth every six (6) hours as needed for Pain for up to 3 days. Max Daily Amount: 4 Tabs. 2. Rectal bleeding  -     REFERRAL TO GASTROENTEROLOGY        I spent at least 20 minutes on this visit with this established patient. 712  Subjective:   Patient was seen in the Ed on 4/26/2021 for renal stone. HPI: Naomi Rosales, 36 y.o. male PMHx significant for anxiety, avascular necrosis, depression, gout presents ambulatory to the ED with cc of intermittent sharp left flank and left low back pain that began today. Denies dysuria, hematuria, vomiting, fever/chills. Denies history of kidney stones. Patient also reports intermittent nonbloody diarrhea x1 week. Denies abdominal pain. Patient does not take anything for symptoms. Denies previous abdominal surgeries. Denies recent trauma or injury. Denies numbness/tingling, radiating pain, saddle anesthesia, loss of bowel or bladder function, IV drug use. Patient states he continues to have bilateral flank pain. Comments Norco has not been effective. Patient has not been seen by urology. States he did attempt to call to schedule a follow up however, was informed he needed a referral.  Admits to family history of renal stones in 2 brothers and 1 sister. Patient also reports he has been having loose stools for the last 2-3 weeks. Comments he was seen at [facility].  Comments he thought he had food poising. Reports he was informed his labs were normal.  States he has been taking imodium which does help with loose stools however, he has been passing bright red blood.   Comments he previously had pain prior to taking imodium. Denies family history of colorectal cancers. CT Results (most recent):  Results from Hospital Encounter encounter on 04/26/21   CT ABD PELV WO CONT    Narrative CT abdomen and pelvis for stone study     HISTORY: Left flank pain and lower back pain    COMPARISON: None. TECHNIQUE: Helical scan to the abdomen and pelvis is obtained without oral or IV  contrast administration per stone protocol. All CT scans at this facility performed using dose optimization techniques as  appreciated to a performed exam, to include automated exposure control,  adjustment of the mA and or KU according to patient size (including appropriate  matching for site specific examination), or use of iterative reconstruction  technique. FINDINGS: Imaging portion to the lung bases appear clear. KIDNEYS, URETERS AND BLADDER:     The bilateral kidneys appear normal in size and contour. 3 x 4 mm calculus seen  in the midpole of right kidney and 2 mm nonobstructive calculus in the lower  pole of left kidney. No evidence of obstructing calculi or hydronephrosis   identified. The distal bilateral ureters are obscured by significant streak artifact from  bilateral hip prosthesis. The visualized proximal and mid portion ureters are  nondilated with no ureteral calculi present. The bladder is also significantly  obscured, especially at inferior aspect. Poor distention noted. CT ABDOMEN AND PELVIS:     The liver shows moderate fatty infiltration. The gallbladder is mildly distended  with no calculus or inflammation. No biliary dilatation. The spleen, pancreas,  gallbladder and both adrenal glands appear grossly normal on this noncontrast  study. The appendix appears normal.The small and large bowel appear nondilated. The bladder is completely obscured by bilateral hip prosthesis. No  retroperitoneal adenopathy identified. Abdominal aorta appears unremarkable for  age. No pelvic free fluid identified. Windy Ángel CT OSSEOUS STRUCTURES:     Bilateral hip replacement surgeries are noted. .      Impression 1. Bilateral nonobstructive renal calculi. No hydronephrosis. 2. The distal bilateral ureters, bladder and prostate are markedly obscured by  bilateral hip prosthesis. 3. Mild hepatic steatosis. No other significant findings seen. Thank you for this referral.       Prior to Admission medications    Medication Sig Start Date End Date Taking? Authorizing Provider   tamsulosin (Flomax) 0.4 mg capsule Take 1 Cap by mouth daily for 15 days. 4/26/21 5/11/21  AISHA Jose   naproxen (NAPROSYN) 500 mg tablet Take 1 Tab by mouth two (2) times daily (with meals). 4/26/21   AISHA Jose   loperamide (IMODIUM) 2 mg capsule Take 1 Cap by mouth four (4) times daily as needed for Diarrhea for up to 10 days. 4/26/21 5/6/21  AISHA Jose   pyridoxine, vitamin B6, (Vitamin B-6) 250 mg tablet Take 500 mg by mouth daily. 4/6/21   Yonatan LEOS NP   allopurinoL (ZYLOPRIM) 100 mg tablet TAKE ONE TABLET BY MOUTH DAILY 3/23/21   Yonatan LEOS NP   hydroCHLOROthiazide (HYDRODIURIL) 25 mg tablet Take 1 Tab by mouth daily. 3/23/21   Yonatan LEOS NP   meloxicam (Mobic) 15 mg tablet Take 1 Tab by mouth daily (with breakfast). 3/11/21   Hussein Ricardo MD   venlafaxine-SR [facility] 150 mg capsule Take 150 mg by mouth daily. 1/23/20   Provider, Historical   pyridoxine HCl, vitamin B6, (VITAMIN B-6 PO) Take 500 mg by mouth daily. Provider, Historical   clonazePAM (KLONOPIN) 2 mg tablet Take 2 mg by mouth three (3) times daily. Indications: PANIC DISORDER, QTY #90, Written on 8/18/16, Filled on 10/7/16. Prescriber Psychiatry Eileen Issa NP     Patient Active Problem List   Diagnosis Code    Poison sumac L23.7    Depression F32.9    Anxiety F41.9    Hip pain, chronic M25.559, G89.29    Avascular necrosis (HCC) M87.00    Tobacco abuse Z72.0    Acute deep vein thrombosis (DVT) of proximal vein of left lower extremity (HCC) I82.4Y2     Patient Active Problem List    Diagnosis Date Noted    Acute deep vein thrombosis (DVT) of proximal vein of left lower extremity ([facility] [address] 04/06/2021    Depression     Anxiety     Hip pain, chronic     Avascular necrosis (HCC)     Tobacco abuse     Poison sumac 09/06/2011     Current Outpatient Medications   Medication Sig Dispense Refill    tadalafiL (CIALIS) 20 mg tablet Take 1 Tab by mouth as needed for Erectile Dysfunction. 30 Tab 1    naproxen (NAPROSYN) 500 mg tablet Take 1 Tab by mouth two (2) times daily (with meals). 20 Tab 0    pyridoxine, vitamin B6, (Vitamin B-6) 250 mg tablet Take 500 mg by mouth daily. 180 Tab 2    allopurinoL (ZYLOPRIM) 100 mg tablet TAKE ONE TABLET BY MOUTH DAILY 90 Tab 0    hydroCHLOROthiazide (HYDRODIURIL) 25 mg tablet Take 1 Tab by mouth daily. 30 Tab 2    meloxicam (Mobic) 15 mg tablet Take 1 Tab by mouth daily (with breakfast). 30 Tab 1    venlafaxine-SR (EFFEXOR-XR) 150 mg capsule Take 150 mg by mouth daily.  pyridoxine HCl, vitamin B6, (VITAMIN B-6 PO) Take 500 mg by mouth daily.  clonazePAM (KLONOPIN) 2 mg tablet Take 2 mg by mouth three (3) times daily. Indications: PANIC DISORDER, QTY #90, Written on 8/18/16, Filled on 10/7/16. Prescriber Psychiatry Eileen Renee       No Known Allergies  Past Medical History:   Diagnosis Date    Anxiety     Psychiatry Lois Feliz See Arthritis     Avascular necrosis ([facility] [address]     Arpita Elizabeth Press    [facility] Depression     Psychiatry Lois Feliz See Gout     Hip pain, chronic     [facility] Dr. Glenn Garcia     Tobacco abuse      Past Surgical History:   Procedure Laterality Date    HX HIP REPLACEMENT Left 07/22/2015    [facility] Dr. Samanta Cordova Right 11/05/2015    [facility] Dr[facility] Glenn Garcia     HX WISDOM TEETH EXTRACTION       Family History   Problem Relation Age of Onset    Anxiety Mother     Heart Attack Father     Diabetes Brother     Heart Disease Paternal Grandmother     Diabetes Paternal Grandmother     Heart Disease Paternal Grandfather      Social History     Tobacco Use    Smoking status: Former Smoker     Packs/day: 0.50     Years: 20.00     Pack years: 10.00     Types: Cigarettes     Quit date: 2020     Years since quittin.7    Smokeless tobacco: Never Used   Substance Use Topics    Alcohol use: Yes     Comment: 1 case of beer a week       ROS    Objective:     Patient-Reported Vitals 3/30/2021   Patient-Reported Weight 215   Patient-Reported Height 6'   Patient-Reported Pulse 102   Patient-Reported Temperature 98.4   Patient-Reported Systolic  643   Patient-Reported Diastolic 77      General: alert, cooperative, no distress   Mental  status: normal mood, behavior, speech, dress, motor activity, and thought processes, able to follow commands   HENT: NCAT   Neck: no visualized mass   Resp: no respiratory distress   Neuro: no gross deficits   Skin: no discoloration or lesions of concern on visible areas   Psychiatric: normal affect, consistent with stated mood, no evidence of hallucinations     Additional exam findings: We discussed the expected course, resolution and complications of the diagnosis(es) in detail. Medication risks, benefits, costs, interactions, and alternatives were discussed as indicated. I advised him to contact the office if his condition worsens, changes or fails to improve as anticipated. He expressed understanding with the diagnosis(es) and plan. Polly Casco, was evaluated through a synchronous (real-time) audio-video encounter. The patient (or guardian if applicable) is aware that this is a billable service. Verbal consent to proceed has been obtained within the past 12 months.  The visit was conducted pursuant to the emergency declaration under the [address] [facility] waiver authority and the Coronavirus Preparedness and Response Supplemental Appropriations Act. Patient identification was verified, and a caregiver was present when appropriate. The patient was located in a state where the provider was credentialed to provide care.     Dutch Kebede NP

## 2021-05-06 NOTE — PATIENT INSTRUCTIONS
Andrew Ornelas [address] [facility] [address] [facility] [address] Phone: 
Fax:   
  [phone]

## 2021-06-03 ENCOUNTER — TELEPHONE (OUTPATIENT)
Dept: FAMILY MEDICINE CLINIC | Age: 41
End: 2021-06-03

## 2021-06-21 RX ORDER — HYDROCHLOROTHIAZIDE 25 MG/1
TABLET ORAL
Qty: 90 TABLET | Refills: 1 | Status: SHIPPED | OUTPATIENT
Start: 2021-06-21 | End: 2021-12-31

## 2021-07-06 ENCOUNTER — TELEPHONE (OUTPATIENT)
Dept: FAMILY MEDICINE CLINIC | Age: 41
End: 2021-07-06

## 2021-07-06 NOTE — TELEPHONE ENCOUNTER
Patient called in stating he was running late to his appt due to traffic.  Patient appt has been r/s

## 2021-07-08 RX ORDER — TADALAFIL 20 MG/1
TABLET ORAL
Qty: 30 TABLET | Refills: 0 | Status: SHIPPED | OUTPATIENT
Start: 2021-07-08 | End: 2021-08-05

## 2021-08-05 RX ORDER — TADALAFIL 20 MG/1
TABLET ORAL
Qty: 30 TABLET | Refills: 0 | Status: SHIPPED | OUTPATIENT
Start: 2021-08-05 | End: 2021-09-02

## 2021-08-17 ENCOUNTER — OFFICE VISIT (OUTPATIENT)
Dept: FAMILY MEDICINE CLINIC | Age: 41
End: 2021-08-17
Payer: COMMERCIAL

## 2021-08-17 VITALS
HEART RATE: 100 BPM | OXYGEN SATURATION: 98 % | BODY MASS INDEX: 30.48 KG/M2 | RESPIRATION RATE: 20 BRPM | HEIGHT: 72 IN | DIASTOLIC BLOOD PRESSURE: 92 MMHG | WEIGHT: 225 LBS | TEMPERATURE: 97.8 F | SYSTOLIC BLOOD PRESSURE: 136 MMHG

## 2021-08-17 DIAGNOSIS — E78.00 PURE HYPERCHOLESTEROLEMIA: ICD-10-CM

## 2021-08-17 DIAGNOSIS — I10 ESSENTIAL HYPERTENSION: Primary | ICD-10-CM

## 2021-08-17 DIAGNOSIS — R73.03 PREDIABETES: ICD-10-CM

## 2021-08-17 DIAGNOSIS — M10.00 ACUTE IDIOPATHIC GOUT, UNSPECIFIED SITE: ICD-10-CM

## 2021-08-17 PROCEDURE — 99214 OFFICE O/P EST MOD 30 MIN: CPT | Performed by: NURSE PRACTITIONER

## 2021-08-17 RX ORDER — ALLOPURINOL 100 MG/1
TABLET ORAL
Qty: 90 TABLET | Refills: 2 | Status: SHIPPED | OUTPATIENT
Start: 2021-08-17 | End: 2022-07-11

## 2021-08-17 NOTE — PROGRESS NOTES
Juan Sandoval is a 39 y.o. male who was seen in clinic today (8/17/2021) for Hypertension    Assessment & Plan:   Diagnoses and all orders for this visit:    1. Essential hypertension  -     CBC WITH AUTOMATED DIFF; Future  -     LIPID PANEL; Future  -     METABOLIC PANEL, COMPREHENSIVE; Future    2. Prediabetes  -     CBC WITH AUTOMATED DIFF; Future  -     HEMOGLOBIN A1C WITH EAG; Future  -     LIPID PANEL; Future  -     METABOLIC PANEL, COMPREHENSIVE; Future    3. Pure hypercholesterolemia  -     CBC WITH AUTOMATED DIFF; Future  -     LIPID PANEL; Future  -     METABOLIC PANEL, COMPREHENSIVE; Future    4. Acute idiopathic gout, unspecified site  -     allopurinoL (ZYLOPRIM) 100 mg tablet; TAKE ONE TABLET BY MOUTH DAILY      Advised patient to use vaseline to lip to help with dryness. Instructed to strictly adhere to low sodium diet. I have discussed the diagnosis with the patient and the intended plan as seen in the above orders. The patient has received an after-visit summary and questions were answered concerning future plans. I have discussed medication side effects and warnings with the patient as well. Patient agreeable with above plan and verbalizes understanding. Follow-up and Dispositions    · Return in about 4 months (around 12/17/2021) for HTN/HLD, in office follow up, fasting labs 1 wk prior. Subjective:   Hypertension ROS: taking medications as instructed, no medication side effects noted, no TIA's, no chest pain on exertion, no dyspnea on exertion, no swelling of ankles.     Lab Results   Component Value Date/Time    Sodium 140 04/26/2021 10:00 AM    Potassium 4.4 04/26/2021 10:00 AM    Chloride 108 04/26/2021 10:00 AM    CO2 27 04/26/2021 10:00 AM    Anion gap 5 04/26/2021 10:00 AM    Glucose 102 (H) 04/26/2021 10:00 AM    BUN 11 04/26/2021 10:00 AM    Creatinine 0.81 04/26/2021 10:00 AM    BUN/Creatinine ratio 14 04/26/2021 10:00 AM    GFR est AA >60 04/26/2021 10:00 AM    GFR est non-AA >60 04/26/2021 10:00 AM    Calcium 9.3 04/26/2021 10:00 AM    Bilirubin, total 0.3 03/23/2021 03:44 PM    Alk. phosphatase 91 03/23/2021 03:44 PM    Protein, total 7.9 03/23/2021 03:44 PM    Albumin 4.2 03/23/2021 03:44 PM    Globulin 3.7 03/23/2021 03:44 PM    A-G Ratio 1.1 03/23/2021 03:44 PM    ALT (SGPT) 40 03/23/2021 03:44 PM    AST (SGOT) 29 03/23/2021 03:44 PM     Lab Results   Component Value Date/Time    Cholesterol, total 211 (H) 03/23/2021 03:44 PM    HDL Cholesterol 63 (H) 03/23/2021 03:44 PM    LDL, calculated 97.4 03/23/2021 03:44 PM    VLDL, calculated 50.6 03/23/2021 03:44 PM    Triglyceride 253 (H) 03/23/2021 03:44 PM    CHOL/HDL Ratio 3.3 03/23/2021 03:44 PM     Lab Results   Component Value Date/Time    Hemoglobin A1c 5.5 05/27/2020 04:40 PM     No results found for: Praveen Jones, [id number]    Lab Results   Component Value Date/Time    WBC 12.3 04/26/2021 10:00 AM    HGB 14.6 04/26/2021 10:00 AM    HCT 43.9 04/26/2021 10:00 AM    PLATELET 801 71/03/8362 10:00 AM    MCV 91.1 04/26/2021 10:00 AM     Wt Readings from Last 3 Encounters:   08/17/21 225 lb (102.1 kg)   04/26/21 209 lb (94.8 kg)   04/07/21 221 lb (100.2 kg)     Temp Readings from Last 3 Encounters:   08/17/21 97.8 °F (36.6 °C) (Temporal)   04/26/21 98.6 °F (37 °C)   03/31/21 97 °F (36.1 °C)     BP Readings from Last 3 Encounters:   08/17/21 (!) 136/94   04/26/21 (!) 168/118   03/23/21 (!) 150/98     Pulse Readings from Last 3 Encounters:   08/17/21 100   04/26/21 75   04/07/21 99       Prior to Admission medications    Medication Sig Start Date End Date Taking?  Authorizing Provider   tadalafiL (CIALIS) 20 mg tablet TAKE 1 TABLET BY MOUTH AS NEEDED FOR ERECTILE DYSFUNCTION AS DIRECTED 8/5/21  Yes Siomara Zee NP   hydroCHLOROthiazide (HYDRODIURIL) 25 mg tablet TAKE ONE TABLET BY MOUTH DAILY 6/21/21  Yes Siomara Zee NP   naproxen (NAPROSYN) 500 mg tablet Take 1 Tab by mouth two (2) times daily (with meals). 4/26/21  Yes AISHA Falk   pyridoxine, vitamin B6, (Vitamin B-6) 250 mg tablet Take 500 mg by mouth daily. 4/6/21  Yes Peggy Hernandez NP   allopurinoL (ZYLOPRIM) 100 mg tablet TAKE ONE TABLET BY MOUTH DAILY 3/23/21  Yes Octavia LEOS NP   meloxicam (Mobic) 15 mg tablet Take 1 Tab by mouth daily (with breakfast). 3/11/21  Yes Xavier Bal MD   venlafaxine-SR [facility] 150 mg capsule Take 150 mg by mouth daily. 1/23/20  Yes Provider, Historical   pyridoxine HCl, vitamin B6, (VITAMIN B-6 PO) Take 500 mg by mouth daily. Yes Provider, Historical   clonazePAM (KLONOPIN) 2 mg tablet Take 2 mg by mouth three (3) times daily. Indications: PANIC DISORDER, QTY #90, Written on 8/18/16, Filled on 10/7/16. Prescriber Psychiatry [place] [id number] Duncan Poole NP         The following sections were reviewed & updated as appropriate: PMH, PSH, FH, and SH. Review of Systems   Constitutional: Negative for activity change, appetite change, chills, fatigue and fever. Respiratory: Negative for chest tightness and shortness of breath. Cardiovascular: Negative for chest pain and leg swelling. Neurological: Negative for dizziness and headaches. Objective:     Visit Vitals  BP (!) 136/92 (BP 1 Location: Right upper arm, BP Patient Position: Sitting, BP Cuff Size: Adult)   Pulse 100   Temp 97.8 °F (36.6 °C) (Temporal)   Resp 20   Ht 6' (1.829 m)   Wt 225 lb (102.1 kg)   SpO2 98%   BMI 30.52 kg/m²      Physical Exam  Constitutional:       General: He is not in acute distress. Appearance: He is well-developed. He is not diaphoretic. HENT:      Head: Normocephalic and atraumatic. Mouth/Throat:     Neck:      Vascular: No carotid bruit. Cardiovascular:      Rate and Rhythm: Normal rate and regular rhythm. Heart sounds: Normal heart sounds. No murmur heard. No friction rub. No gallop.     Pulmonary:      Effort: Pulmonary effort is normal.      Breath sounds: Normal breath sounds. No stridor. No wheezing or rales. Musculoskeletal:      Cervical back: Normal range of motion and neck supple. Lymphadenopathy:      Cervical: No cervical adenopathy. Skin:     General: Skin is warm and dry. Neurological:      Mental Status: He is alert and oriented to person, place, and time. Disclaimer: The patient understands our medical plan. Alternatives have been explained and offered. The risks, benefits and significant side effects of all medications have been reviewed. Anticipated time course and progression of condition reviewed. All questions have been addressed. He is encouraged to employ the information provided in the after visit summary, which was reviewed. Where applicable, he is instructed to call the clinic if he has not been notified either by phone or through [address] with the results of his tests or with an appointment plan for any referrals within 1 week(s). No news is not good news; it's no news. The patient  is to call if his condition worsens or fails to improve or if significant side effects are experienced. Aspects of this note may have been generated using voice recognition software. Despite editing, there may be unrecognized errors.        Mónica Fuller, ELIZABETH

## 2021-09-02 NOTE — TELEPHONE ENCOUNTER
Last Visit: 8/17/21 with NP Merla Severance  Next Appointment: Advised to follow-up in 4 months  Previous Refill Encounter(s): 8/5/21 #30    Requested Prescriptions     Pending Prescriptions Disp Refills    tadalafiL (CIALIS) 20 mg tablet [Pharmacy Med Name: TADALAFIL 20 MG TABLET] 30 Tablet 3     Sig: TAKE 1 TABLET BY MOUTH AS DIRECTED AS NEEDED

## 2021-09-17 RX ORDER — TADALAFIL 20 MG/1
TABLET ORAL
Qty: 30 TABLET | Refills: 3 | Status: SHIPPED
Start: 2021-09-17 | End: 2022-02-23

## 2021-12-31 RX ORDER — HYDROCHLOROTHIAZIDE 25 MG/1
TABLET ORAL
Qty: 90 TABLET | Refills: 1 | Status: SHIPPED | OUTPATIENT
Start: 2021-12-31 | End: 2022-07-20 | Stop reason: SDUPTHER

## 2022-02-20 PROCEDURE — 99284 EMERGENCY DEPT VISIT MOD MDM: CPT

## 2022-02-21 ENCOUNTER — HOSPITAL ENCOUNTER (EMERGENCY)
Age: 42
Discharge: OTHER INSTITUTION | End: 2022-02-21
Attending: EMERGENCY MEDICINE
Payer: COMMERCIAL

## 2022-02-21 ENCOUNTER — APPOINTMENT (OUTPATIENT)
Dept: CT IMAGING | Age: 42
End: 2022-02-21
Attending: EMERGENCY MEDICINE
Payer: COMMERCIAL

## 2022-02-21 VITALS
OXYGEN SATURATION: 98 % | RESPIRATION RATE: 18 BRPM | HEIGHT: 72 IN | WEIGHT: 230 LBS | TEMPERATURE: 98.2 F | SYSTOLIC BLOOD PRESSURE: 137 MMHG | HEART RATE: 93 BPM | DIASTOLIC BLOOD PRESSURE: 92 MMHG | BODY MASS INDEX: 31.15 KG/M2

## 2022-02-21 DIAGNOSIS — M54.2 NECK PAIN: ICD-10-CM

## 2022-02-21 DIAGNOSIS — I77.74 DISSECTION OF EXTRACRANIAL VERTEBRAL ARTERY (HCC): Primary | ICD-10-CM

## 2022-02-21 PROBLEM — I72.6 VERTEBRAL ARTERY PSEUDOANEURYSM (HCC): Status: ACTIVE | Noted: 2022-02-21

## 2022-02-21 LAB
ALBUMIN SERPL-MCNC: 4.4 G/DL (ref 3.4–5)
ALBUMIN/GLOB SERPL: 1.2 {RATIO} (ref 0.8–1.7)
ALP SERPL-CCNC: 90 U/L (ref 45–117)
ALT SERPL-CCNC: 51 U/L (ref 16–61)
ANION GAP SERPL CALC-SCNC: 6 MMOL/L (ref 3–18)
AST SERPL-CCNC: 27 U/L (ref 10–38)
BASOPHILS # BLD: 0.2 K/UL (ref 0–0.1)
BASOPHILS NFR BLD: 1 % (ref 0–2)
BILIRUB SERPL-MCNC: 0.5 MG/DL (ref 0.2–1)
BUN SERPL-MCNC: 10 MG/DL (ref 7–18)
BUN/CREAT SERPL: 11 (ref 12–20)
CALCIUM SERPL-MCNC: 8.9 MG/DL (ref 8.5–10.1)
CHLORIDE SERPL-SCNC: 96 MMOL/L (ref 100–111)
CO2 SERPL-SCNC: 30 MMOL/L (ref 21–32)
CREAT SERPL-MCNC: 0.95 MG/DL (ref 0.6–1.3)
DIFFERENTIAL METHOD BLD: ABNORMAL
EOSINOPHIL # BLD: 0.6 K/UL (ref 0–0.4)
EOSINOPHIL NFR BLD: 4 % (ref 0–5)
ERYTHROCYTE [DISTWIDTH] IN BLOOD BY AUTOMATED COUNT: 11.9 % (ref 11.6–14.5)
GLOBULIN SER CALC-MCNC: 3.6 G/DL (ref 2–4)
GLUCOSE SERPL-MCNC: 91 MG/DL (ref 74–99)
HCT VFR BLD AUTO: 43 % (ref 36–48)
HGB BLD-MCNC: 14.9 G/DL (ref 13–16)
IMM GRANULOCYTES # BLD AUTO: 0.1 K/UL (ref 0–0.04)
IMM GRANULOCYTES NFR BLD AUTO: 1 % (ref 0–0.5)
LYMPHOCYTES # BLD: 3.6 K/UL (ref 0.9–3.6)
LYMPHOCYTES NFR BLD: 26 % (ref 21–52)
MCH RBC QN AUTO: 31.1 PG (ref 24–34)
MCHC RBC AUTO-ENTMCNC: 34.7 G/DL (ref 31–37)
MCV RBC AUTO: 89.8 FL (ref 78–100)
MONOCYTES # BLD: 1 K/UL (ref 0.05–1.2)
MONOCYTES NFR BLD: 7 % (ref 3–10)
NEUTS SEG # BLD: 8.4 K/UL (ref 1.8–8)
NEUTS SEG NFR BLD: 61 % (ref 40–73)
NRBC # BLD: 0 K/UL (ref 0–0.01)
NRBC BLD-RTO: 0 PER 100 WBC
PLATELET # BLD AUTO: 338 K/UL (ref 135–420)
PMV BLD AUTO: 9.2 FL (ref 9.2–11.8)
POTASSIUM SERPL-SCNC: 3.9 MMOL/L (ref 3.5–5.5)
PROT SERPL-MCNC: 8 G/DL (ref 6.4–8.2)
RBC # BLD AUTO: 4.79 M/UL (ref 4.35–5.65)
SODIUM SERPL-SCNC: 132 MMOL/L (ref 136–145)
TROPONIN-HIGH SENSITIVITY: <3 NG/L (ref 0–78)
WBC # BLD AUTO: 13.8 K/UL (ref 4.6–13.2)

## 2022-02-21 PROCEDURE — 74011000250 HC RX REV CODE- 250: Performed by: EMERGENCY MEDICINE

## 2022-02-21 PROCEDURE — 74011000636 HC RX REV CODE- 636: Performed by: EMERGENCY MEDICINE

## 2022-02-21 PROCEDURE — 74011250636 HC RX REV CODE- 250/636: Performed by: EMERGENCY MEDICINE

## 2022-02-21 PROCEDURE — 70496 CT ANGIOGRAPHY HEAD: CPT

## 2022-02-21 PROCEDURE — 85025 COMPLETE CBC W/AUTO DIFF WBC: CPT

## 2022-02-21 PROCEDURE — 84484 ASSAY OF TROPONIN QUANT: CPT

## 2022-02-21 PROCEDURE — 96374 THER/PROPH/DIAG INJ IV PUSH: CPT

## 2022-02-21 PROCEDURE — 74011250637 HC RX REV CODE- 250/637: Performed by: EMERGENCY MEDICINE

## 2022-02-21 PROCEDURE — 80053 COMPREHEN METABOLIC PANEL: CPT

## 2022-02-21 PROCEDURE — 70450 CT HEAD/BRAIN W/O DYE: CPT

## 2022-02-21 PROCEDURE — 96375 TX/PRO/DX INJ NEW DRUG ADDON: CPT

## 2022-02-21 RX ORDER — KETOROLAC TROMETHAMINE 15 MG/ML
15 INJECTION, SOLUTION INTRAMUSCULAR; INTRAVENOUS
Status: DISCONTINUED | OUTPATIENT
Start: 2022-02-21 | End: 2022-02-21

## 2022-02-21 RX ORDER — CLOPIDOGREL 300 MG/1
300 TABLET, FILM COATED ORAL
Status: COMPLETED | OUTPATIENT
Start: 2022-02-21 | End: 2022-02-21

## 2022-02-21 RX ORDER — DIAZEPAM 10 MG/2ML
5 INJECTION INTRAMUSCULAR ONCE
Status: COMPLETED | OUTPATIENT
Start: 2022-02-21 | End: 2022-02-21

## 2022-02-21 RX ORDER — GUAIFENESIN 100 MG/5ML
81 LIQUID (ML) ORAL
Status: COMPLETED | OUTPATIENT
Start: 2022-02-21 | End: 2022-02-21

## 2022-02-21 RX ORDER — METOCLOPRAMIDE HYDROCHLORIDE 5 MG/ML
10 INJECTION INTRAMUSCULAR; INTRAVENOUS ONCE
Status: COMPLETED | OUTPATIENT
Start: 2022-02-21 | End: 2022-02-21

## 2022-02-21 RX ORDER — LIDOCAINE 4 G/100G
1 PATCH TOPICAL EVERY 24 HOURS
Status: DISCONTINUED | OUTPATIENT
Start: 2022-02-21 | End: 2022-02-21 | Stop reason: HOSPADM

## 2022-02-21 RX ORDER — HYDROMORPHONE HYDROCHLORIDE 1 MG/ML
0.5 INJECTION, SOLUTION INTRAMUSCULAR; INTRAVENOUS; SUBCUTANEOUS
Status: COMPLETED | OUTPATIENT
Start: 2022-02-21 | End: 2022-02-21

## 2022-02-21 RX ORDER — DIPHENHYDRAMINE HYDROCHLORIDE 50 MG/ML
25 INJECTION, SOLUTION INTRAMUSCULAR; INTRAVENOUS ONCE
Status: COMPLETED | OUTPATIENT
Start: 2022-02-21 | End: 2022-02-21

## 2022-02-21 RX ADMIN — DIAZEPAM 5 MG: 5 INJECTION, SOLUTION INTRAMUSCULAR; INTRAVENOUS at 02:09

## 2022-02-21 RX ADMIN — DIPHENHYDRAMINE HYDROCHLORIDE 25 MG: 50 INJECTION, SOLUTION INTRAMUSCULAR; INTRAVENOUS at 04:11

## 2022-02-21 RX ADMIN — ASPIRIN 81 MG 81 MG: 81 TABLET ORAL at 05:06

## 2022-02-21 RX ADMIN — SODIUM CHLORIDE 1000 ML: 900 INJECTION, SOLUTION INTRAVENOUS at 04:10

## 2022-02-21 RX ADMIN — IOPAMIDOL 100 ML: 755 INJECTION, SOLUTION INTRAVENOUS at 03:30

## 2022-02-21 RX ADMIN — HYDROMORPHONE HYDROCHLORIDE 0.5 MG: 1 INJECTION, SOLUTION INTRAMUSCULAR; INTRAVENOUS; SUBCUTANEOUS at 02:43

## 2022-02-21 RX ADMIN — METOCLOPRAMIDE HYDROCHLORIDE 10 MG: 5 INJECTION INTRAMUSCULAR; INTRAVENOUS at 04:11

## 2022-02-21 RX ADMIN — CLOPIDOGREL BISULFATE 300 MG: 300 TABLET, FILM COATED ORAL at 05:06

## 2022-02-21 NOTE — ED PROVIDER NOTES
EMERGENCY DEPARTMENT HISTORY AND PHYSICAL EXAM    1:43 AM    Date: 2/21/2022  Patient Name: Blanche Lazaro    History of Presenting Illness     Chief Complaint   Patient presents with    Neck Pain       History Provided By: Patient  Location/Duration/Severity/Modifying factors   54-year-old male presents to the emergency department with a chief complaint of right-sided neck pain. Reports that started around 5 hours ago. He was at home playing a video game when it started nowhere. Reports it located at the right base of his neck. Radiates to the right trapezius as well as up into his head. He denies any injuries or trauma. Rates it as around a 8 or 9 at onset down to around a 5 or 6 right now he took 800 mg ibuprofen prior to arrival.  He denies any chest pain or shortness of breath. He had a tingling sensation in his fingertips on the right side that has since gone away. Neck pain was accompanied by a mild to moderate headache which is also seemingly gone away. The pain in his neck is still persistent worsens when he turns his head to the right as well as to the left. He denies any fevers or chills. No chest pain vomiting or shortness of breath. Reports that it feels reminiscent of when he had a blood clot in his leg as a \"charley horse sensation\", however in his neck. No chest pain or shortness of breath. He also describes some right hand numbness associated with it. PCP: Jessica Doran MD        Past History     Past Medical History:  No past medical history on file. Past Surgical History:  No past surgical history on file. Family History:  No family history on file.     Social History:  Social History     Tobacco Use    Smoking status: Not on file    Smokeless tobacco: Not on file   Substance Use Topics    Alcohol use: Not on file    Drug use: Not on file       Allergies:  No Known Allergies    I reviewed and confirmed the above information with patient and updated as necessary. Review of Systems     Review of Systems   Constitutional: Negative for chills and fever. HENT: Negative for congestion, rhinorrhea, sinus pressure and sneezing. Eyes: Negative for visual disturbance. Respiratory: Negative for cough and shortness of breath. Cardiovascular: Negative for chest pain. Gastrointestinal: Negative for abdominal pain, diarrhea, nausea and vomiting. Genitourinary: Negative for dysuria, frequency and urgency. Musculoskeletal: Positive for neck pain and neck stiffness. Negative for back pain. Skin: Negative for rash. Neurological: Negative for syncope, numbness and headaches. Physical Exam     Visit Vitals  BP (!) 137/92   Pulse 93   Temp 98.2 °F (36.8 °C)   Resp 18   Ht 6' (1.829 m)   Wt 104.3 kg (230 lb)   SpO2 98%   BMI 31.19 kg/m²       Physical Exam  Constitutional:       General: He is not in acute distress. Appearance: Normal appearance. He is normal weight. He is not ill-appearing or toxic-appearing. HENT:      Head: Normocephalic and atraumatic. Right Ear: External ear normal.      Left Ear: External ear normal.      Nose: Nose normal.      Mouth/Throat:      Mouth: Mucous membranes are moist.      Pharynx: No oropharyngeal exudate or posterior oropharyngeal erythema. Eyes:      Conjunctiva/sclera: Conjunctivae normal.      Pupils: Pupils are equal, round, and reactive to light. Neck:      Comments: No midline spinal tenderness, tenderness elicited to the left lateral neck musculature located in the musculature in the back and lateral aspect of the neck as well as tenderness into the left trapezius musculature    Cardiovascular:      Rate and Rhythm: Normal rate and regular rhythm. Pulses: Normal pulses. Heart sounds: Normal heart sounds. No murmur heard. No friction rub. Pulmonary:      Effort: Pulmonary effort is normal.      Breath sounds: Normal breath sounds. No wheezing, rhonchi or rales.    Abdominal: General: Abdomen is flat. Tenderness: There is no abdominal tenderness. There is no guarding or rebound. Musculoskeletal:         General: No swelling or tenderness. Normal range of motion. Cervical back: Normal range of motion and neck supple. Right lower leg: No edema. Left lower leg: No edema. Skin:     General: Skin is warm and dry. Capillary Refill: Capillary refill takes less than 2 seconds. Neurological:      General: No focal deficit present. Mental Status: He is alert. Motor: No weakness. Comments: Patient is alert, oriented x4. Patient responds appropriately to questioning. Cranial nerves II through XII are grossly intact. Equal strength in the upper and lower extremities bilaterally which is symmetric. Finger-to-nose and heel-to-shin testing is normal.  Normal sensory exam.    Initial right-sided facial droop involving only the mouth, resolves with additional effort. Diagnostic Study Results     Labs -  Recent Results (from the past 24 hour(s))   CBC WITH AUTOMATED DIFF    Collection Time: 02/21/22  2:05 AM   Result Value Ref Range    WBC 13.8 (H) 4.6 - 13.2 K/uL    RBC 4.79 4.35 - 5.65 M/uL    HGB 14.9 13.0 - 16.0 g/dL    HCT 43.0 36.0 - 48.0 %    MCV 89.8 78.0 - 100.0 FL    MCH 31.1 24.0 - 34.0 PG    MCHC 34.7 31.0 - 37.0 g/dL    RDW 11.9 11.6 - 14.5 %    PLATELET 603 536 - 475 K/uL    MPV 9.2 9.2 - 11.8 FL    NRBC 0.0 0  WBC    ABSOLUTE NRBC 0.00 0.00 - 0.01 K/uL    NEUTROPHILS 61 40 - 73 %    LYMPHOCYTES 26 21 - 52 %    MONOCYTES 7 3 - 10 %    EOSINOPHILS 4 0 - 5 %    BASOPHILS 1 0 - 2 %    IMMATURE GRANULOCYTES 1 (H) 0.0 - 0.5 %    ABS. NEUTROPHILS 8.4 (H) 1.8 - 8.0 K/UL    ABS. LYMPHOCYTES 3.6 0.9 - 3.6 K/UL    ABS. MONOCYTES 1.0 0.05 - 1.2 K/UL    ABS. EOSINOPHILS 0.6 (H) 0.0 - 0.4 K/UL    ABS. BASOPHILS 0.2 (H) 0.0 - 0.1 K/UL    ABS. IMM.  GRANS. 0.1 (H) 0.00 - 0.04 K/UL    DF AUTOMATED     METABOLIC PANEL, COMPREHENSIVE    Collection Time: 02/21/22  2:05 AM   Result Value Ref Range    Sodium 132 (L) 136 - 145 mmol/L    Potassium 3.9 3.5 - 5.5 mmol/L    Chloride 96 (L) 100 - 111 mmol/L    CO2 30 21 - 32 mmol/L    Anion gap 6 3.0 - 18 mmol/L    Glucose 91 74 - 99 mg/dL    BUN 10 7.0 - 18 MG/DL    Creatinine 0.95 0.6 - 1.3 MG/DL    BUN/Creatinine ratio 11 (L) 12 - 20      GFR est AA >60 >60 ml/min/1.73m2    GFR est non-AA >60 >60 ml/min/1.73m2    Calcium 8.9 8.5 - 10.1 MG/DL    Bilirubin, total 0.5 0.2 - 1.0 MG/DL    ALT (SGPT) 51 16 - 61 U/L    AST (SGOT) 27 10 - 38 U/L    Alk. phosphatase 90 45 - 117 U/L    Protein, total 8.0 6.4 - 8.2 g/dL    Albumin 4.4 3.4 - 5.0 g/dL    Globulin 3.6 2.0 - 4.0 g/dL    A-G Ratio 1.2 0.8 - 1.7     TROPONIN-HIGH SENSITIVITY    Collection Time: 02/21/22  2:05 AM   Result Value Ref Range    Troponin-High Sensitivity <3 0 - 78 ng/L         Radiologic Studies -   CTA HEAD NECK W WO CONT         CT HEAD WO CONT   Final Result      Brain looks normal.                    Medical Decision Making   I am the first provider for this patient. I reviewed the vital signs, available nursing notes, past medical history, past surgical history, family history and social history. Vital Signs-Reviewed the patient's vital signs. Records Reviewed: Nursing Notes, Old Medical Records, Previous Radiology Studies and Previous Laboratory Studies (Time of Review: 1:43 AM)      Provider Notes (Medical Decision Making):   MDM  Number of Diagnoses or Management Options  Dissection of extracranial vertebral artery ([address]  Neck pain  Diagnosis management comments: 44-year-old male presented to the ED with complaints of atraumatic left-sided neck pain. It started while he was playing Xbox seemingly out of nowhere. DDx: Musculoskeletal pain, neck strain, neck sprain, vertebral fracture, cervical radiculopathy, no risk factors for spinal epidural abscess.   His pain is peculiar however, indicates that seem to come out of nowhere and is located in proximity to the course of the vertebral artery given the complaint of a headache need to rule vertebral artery dissection. Likelihood still low although patient agreeable to the work-up will do CT brain and CTA head and neck with and without contrast.  Patient does not meet criteria for acute stroke call down and does not meet criteria for TPA. Does not have any focal neurologic deficits. His neurologic exam is essentially normal but he does complain of subjective right-sided hand numbness which was present at onset but since has gone away. CTA shows a 5 mm pseudoaneurysm at the V2 segment, likely secondary to a dissection. No call received about this however I did see the preliminary report, and I spoke to the teleneurologist, Dr. Jordana Fernández, teleneurology who recommends aspirin Plavix and admission if symptomatic in discussion with her intervention. Spoke with [facility] ED attending, Dr. Violet Michel, who spoke to Dr. Andressa Grey at [facility], and they accept the patient in transfer to [facility]. Patient updated and in agreement with the plan of transfer. He feels comfortable with the plan discussed the risk and benefits with him, the diagnosis at length. ED Course: Progress Notes, Reevaluation, and Consults:  ED Course as of 02/21/22 0515   Mon Feb 21, 2022   0451 Discussed with teleneurology, Dr. Daniel Chavarria. Recommends Neurointerventional consult; 81 ASA, 300 Plavix load [DARIO]   0503 Discussed with Dr Violet Michel at [facility]; he spoke with Dr. Andressa Grey the neuro interventional service, and accept the patient in transfer. I will order the aspirin and Plavix per teleneurology's recommendations. [DARIO]      ED Course User Index  [DARIO] Charity Fonseca, DO       Procedures    Critical Care Time: CRITICAL CARE NOTE:    I have spent 43 minutes of critical care time involved in lab review, consultations with specialist, family decision-making, and documentation.   During this entire length of time I was immediately available to the patient. Critical Care: The reason for providing this level of medical care for this critically ill patient was due a critical illness that impaired one or more vital organ systems such that there was a high probability of imminent or life threatening deterioration in the patients condition. This care involved high complexity decision making to assess, manipulate, and support vital system functions, to treat this vital organ system failure and to prevent further life threatening deterioration of the patients condition. Time is exclusive of procedural and teaching time. Yvon Sahu DO      Diagnosis     Clinical Impression:   1. Dissection of extracranial vertebral artery ([address]    2. Neck pain        Disposition: Discharge    Follow-up Information    None          Patient's Medications    No medications on file       Yvon Sahu DO   Emergency Medicine   February 21, 2022, 1:43 AM     This note is dictated utilizing Dragon voice recognition software. Unfortunately this leads to occasional typographical errors using the voice recognition. I apologize in advance if the situation occurs. If questions occur please do not hesitate to contact me directly. Patient was seen  and treated during the context of the COVID-19 pandemic. Contemporary protocols utilized based on the best available evidence, utilizing evolving public health  guidelines and treatment protocols.     vYon Sahu DO

## 2022-02-21 NOTE — ED TRIAGE NOTES
Patient A/O x 4, presented to the ED with complaint of right sided posteriorneck pain upper back pain x 1 hour ago. Patient denies trauma, fever, body aches, chills.

## 2022-03-04 ENCOUNTER — HOSPITAL ENCOUNTER (OUTPATIENT)
Dept: LAB | Age: 42
Discharge: HOME | End: 2022-03-04
Payer: COMMERCIAL

## 2022-03-04 ENCOUNTER — OFFICE VISIT (OUTPATIENT)
Dept: FAMILY MEDICINE CLINIC | Age: 42
End: 2022-03-04
Payer: COMMERCIAL

## 2022-03-04 VITALS
HEART RATE: 113 BPM | WEIGHT: 236 LBS | BODY MASS INDEX: 31.97 KG/M2 | DIASTOLIC BLOOD PRESSURE: 112 MMHG | SYSTOLIC BLOOD PRESSURE: 134 MMHG | RESPIRATION RATE: 18 BRPM | TEMPERATURE: 97.9 F | HEIGHT: 72 IN | OXYGEN SATURATION: 98 %

## 2022-03-04 DIAGNOSIS — I72.6 VERTEBRAL ARTERY PSEUDOANEURYSM (HCC): ICD-10-CM

## 2022-03-04 DIAGNOSIS — I10 ESSENTIAL HYPERTENSION: ICD-10-CM

## 2022-03-04 DIAGNOSIS — I72.6 VERTEBRAL ARTERY PSEUDOANEURYSM (HCC): Primary | ICD-10-CM

## 2022-03-04 DIAGNOSIS — Z09 HOSPITAL DISCHARGE FOLLOW-UP: ICD-10-CM

## 2022-03-04 LAB
ANION GAP SERPL CALC-SCNC: 4 MMOL/L (ref 3–18)
BASOPHILS # BLD: 0.1 K/UL (ref 0–0.1)
BASOPHILS NFR BLD: 1 % (ref 0–2)
BUN SERPL-MCNC: 21 MG/DL (ref 7–18)
BUN/CREAT SERPL: 21 (ref 12–20)
CALCIUM SERPL-MCNC: 9.9 MG/DL (ref 8.5–10.1)
CHLORIDE SERPL-SCNC: 103 MMOL/L (ref 100–111)
CO2 SERPL-SCNC: 31 MMOL/L (ref 21–32)
CREAT SERPL-MCNC: 1.02 MG/DL (ref 0.6–1.3)
DIFFERENTIAL METHOD BLD: ABNORMAL
EOSINOPHIL # BLD: 0.3 K/UL (ref 0–0.4)
EOSINOPHIL NFR BLD: 3 % (ref 0–5)
ERYTHROCYTE [DISTWIDTH] IN BLOOD BY AUTOMATED COUNT: 12.1 % (ref 11.6–14.5)
GLUCOSE SERPL-MCNC: 84 MG/DL (ref 74–99)
HCT VFR BLD AUTO: 40.7 % (ref 36–48)
HGB BLD-MCNC: 13.5 G/DL (ref 13–16)
IMM GRANULOCYTES # BLD AUTO: 0 K/UL (ref 0–0.04)
IMM GRANULOCYTES NFR BLD AUTO: 0 % (ref 0–0.5)
LYMPHOCYTES # BLD: 2.1 K/UL (ref 0.9–3.6)
LYMPHOCYTES NFR BLD: 17 % (ref 21–52)
MCH RBC QN AUTO: 30.4 PG (ref 24–34)
MCHC RBC AUTO-ENTMCNC: 33.2 G/DL (ref 31–37)
MCV RBC AUTO: 91.7 FL (ref 78–100)
MONOCYTES # BLD: 0.8 K/UL (ref 0.05–1.2)
MONOCYTES NFR BLD: 6 % (ref 3–10)
NEUTS SEG # BLD: 8.7 K/UL (ref 1.8–8)
NEUTS SEG NFR BLD: 72 % (ref 40–73)
NRBC # BLD: 0 K/UL (ref 0–0.01)
NRBC BLD-RTO: 0 PER 100 WBC
PLATELET # BLD AUTO: 302 K/UL (ref 135–420)
PMV BLD AUTO: 9.4 FL (ref 9.2–11.8)
POTASSIUM SERPL-SCNC: 4.1 MMOL/L (ref 3.5–5.5)
RBC # BLD AUTO: 4.44 M/UL (ref 4.35–5.65)
SODIUM SERPL-SCNC: 138 MMOL/L (ref 136–145)
WBC # BLD AUTO: 12 K/UL (ref 4.6–13.2)

## 2022-03-04 PROCEDURE — 36415 COLL VENOUS BLD VENIPUNCTURE: CPT

## 2022-03-04 PROCEDURE — 1111F DSCHRG MED/CURRENT MED MERGE: CPT | Performed by: NURSE PRACTITIONER

## 2022-03-04 PROCEDURE — 80048 BASIC METABOLIC PNL TOTAL CA: CPT

## 2022-03-04 PROCEDURE — 99214 OFFICE O/P EST MOD 30 MIN: CPT | Performed by: NURSE PRACTITIONER

## 2022-03-04 PROCEDURE — 85025 COMPLETE CBC W/AUTO DIFF WBC: CPT

## 2022-03-04 RX ORDER — PANTOPRAZOLE SODIUM 40 MG/1
40 TABLET, DELAYED RELEASE ORAL
Qty: 30 TABLET | Refills: 0 | Status: SHIPPED | OUTPATIENT
Start: 2022-03-04 | End: 2022-04-04 | Stop reason: SDUPTHER

## 2022-03-04 RX ORDER — CELECOXIB 200 MG/1
200 CAPSULE ORAL
Qty: 60 CAPSULE | Refills: 0 | Status: SHIPPED | OUTPATIENT
Start: 2022-03-04 | End: 2022-04-04 | Stop reason: SDUPTHER

## 2022-03-04 RX ORDER — METOPROLOL TARTRATE 25 MG/1
25 TABLET, FILM COATED ORAL EVERY 12 HOURS
Qty: 60 TABLET | Refills: 2 | Status: SHIPPED | OUTPATIENT
Start: 2022-03-04 | End: 2022-04-14 | Stop reason: SDUPTHER

## 2022-03-04 NOTE — PATIENT INSTRUCTIONS

## 2022-03-04 NOTE — PROGRESS NOTES
Depression Screening:  3 most recent PHQ Screens 3/4/2022   Little interest or pleasure in doing things Not at all   Feeling down, depressed, irritable, or hopeless Not at all   Total Score PHQ 2 0   Trouble falling or staying asleep, or sleeping too much -   Feeling tired or having little energy -   Poor appetite, weight loss, or overeating -   Feeling bad about yourself - or that you are a failure or have let yourself or your family down -   Trouble concentrating on things such as school, work, reading, or watching TV -   Moving or speaking so slowly that other people could have noticed; or the opposite being so fidgety that others notice -   Thoughts of being better off dead, or hurting yourself in some way -   PHQ 9 Score -   How difficult have these problems made it for you to do your work, take care of your home and get along with others -       Learning Assessment:  No flowsheet data found. Abuse Screening:  No flowsheet data found. Fall Risk  No flowsheet data found. ADL  No flowsheet data found. Travel Screening:    Travel Screening     Question   Response    In the last month, have you been in contact with someone who was confirmed or suspected to have Coronavirus / COVID-19? No / Unsure    Have you had a COVID-19 viral test in the last 14 days? No    Do you have any of the following new or worsening symptoms? None of these    Have you traveled internationally or domestically in the last month? No      Travel History   Travel since 02/04/22    No documented travel since 02/04/22         Health Maintenance reviewed and discussed and ordered per Provider. Health Maintenance Due   Topic Date Due    Flu Vaccine (1) Never done    COVID-19 Vaccine (3 - Booster for [employer] series) 09/23/2021   .     Chan Jason presents today for   Chief Complaint   Patient presents with   [facility] Follow Up       Is someone accompanying this pt? no    Is the patient using any DME equipment during OV? no    Coordination of Care:  1. Have you been to the ER, urgent care clinic since your last visit? Hospitalized since your last visit? no    2. Have you seen or consulted any other health care providers outside of the [address] [place] [address] since your last visit? Include any pap smears or colon screening.  no

## 2022-03-04 NOTE — PROGRESS NOTES
Transitional Care Management Progress Note    Patient: Kathe Pleitez  : 1980  PCP: Isi Tabor NP    Date of office visit: 3/4/2022   Date of admission: 22  Date of discharge: 22  Hospital: [facility]    Call initiated w/i 2 business dates of discharge: *No response documented in the last 14 days   Date of the most recent call to the patient: *No documented post hospital discharge outreach found in the last 14 days        Assessment/Plan:     Diagnoses and all orders for this visit:    1. RVA dissection with pseudoaneurysm  -     CBC WITH AUTOMATED DIFF; Future  -     METABOLIC PANEL, BASIC; Future    2. Essential hypertension    3. Hospital discharge follow-up  -     [place] DISCHARGE MEDS RECONCILED W/ CURRENT OUTPATIENT MED LIST    Other orders  -     pantoprazole (PROTONIX) 40 mg tablet; Take 1 Tablet by mouth Daily (before breakfast). -     celecoxib (CELEBREX) 200 mg capsule; Take 1 Capsule by mouth two (2) times daily as needed for Pain.  -     metoprolol tartrate (LOPRESSOR) 25 mg tablet; Take 1 Tablet by mouth every twelve (12) hours for 30 days. Follow-up and Dispositions    · Return in about 3 weeks (around 3/25/2022) for HTN, in office follow up. Subjective:   Kathe Pleitez is a 39 y.o. male presenting today for follow-up after hospital discharge. This encounter and supporting documentation was reviewed if available. Medication reconciliation was performed today. The main problem requiring admission was   History of presenting illness per admitting hospitalist:   Geovanna Simeon a 39y.o. year old male who presents with   Neck pain.  CTA at outside hospital showed right vertebral artery dissection with pseudoaneurysm.  Patient was transferred here for further evaluation.  Patient developed sudden onset of neck pain while playing videogames.  Patient denies any recent trauma.  Patient underwent MRI of the brain which was negative for CVA.  Patient was evaluated by neurovascular team and recommended no intervention but continue medical therapy with aspirin Plavix which she is already received. Hospital course:   Patient was transferred from outside hospital for right vertebral artery dissection with pseudoaneurysm. Patient was admitted to the neuro floor with neurochecks. Patient had no focal neuro deficits. MRI brain negative for ischemia. Patient's case was evaluated by Dr. Radha Walker and recommended medical management with aspirin, Plavix, high intensity statin. Patient's blood pressure adjusted with addition of antihypertensives. Patient was monitored for worsening signs and symptoms. Patient continues to complain of neck pain and underwent CTA neck, MRI of cervical spine which were negative for acute pathology. Likely patient's pain persisted from musculoskeletal in nature, improved with muscle relaxer, IV Toradol. Patient is getting discharged on short course of celecoxib with PPI, aspirin Plavix. Neurology evaluated and hypercoagulable work-up obtained. Echocardiogram obtained on this admission showed normal EF, no valvular pathology. Complications during admission: none    Interval history/Current status: patient states he is having persistent pain to right posterior neck. Comments when he begins to have pain this does cause his blood pressure to increase. He has been taking tylenol for pain without improvement. Patient states he has been experiencing increased anxiety. He does have a history of anxiety. Admitting symptoms have: slightly improved      Medications marked \"taking\" at this time:  Prior to Admission medications    Medication Sig Start Date End Date Taking? Authorizing Provider   aspirin 81 mg chewable tablet Take 1 Tablet by mouth daily for 30 days.  2/23/22 3/25/22 Yes Miguel Manzano MD   acetaminophen (TYLENOL) 325 mg tablet Take 2 Tablets by mouth every six (6) hours as needed for Pain for up to 12 days. 2/23/22 3/7/22 Yes Ron Mills MD   atorvastatin (LIPITOR) 40 mg tablet Take 1 Tablet by mouth nightly for 30 days. 2/23/22 3/25/22 Yes Ron Mills MD   clopidogreL (PLAVIX) 75 mg tab Take 1 Tablet by mouth daily for 30 days. 2/23/22 3/25/22 Yes Ron Mills MD   cyclobenzaprine (FLEXERIL) 10 mg tablet Take 0.5 Tablets by mouth three (3) times daily as needed for Muscle Spasm(s). 2/23/22  Yes Ron Mills MD   lidocaine (LIDODERM) 5 % Apply patch to the affected area for 12 hours a day and remove for 12 hours a day. 2/23/22  Yes Ron Mills MD   metoprolol tartrate (LOPRESSOR) 25 mg tablet Take 0.5 Tablets by mouth every twelve (12) hours for 30 days. 2/23/22 3/25/22 Yes Ron Mills MD   pantoprazole (PROTONIX) 40 mg tablet Take 1 Tablet by mouth Daily (before breakfast) for 14 days. 2/23/22 3/9/22 Yes Ron Mills MD   hydroCHLOROthiazide (HYDRODIURIL) 25 mg tablet TAKE ONE TABLET BY MOUTH DAILY 12/31/21  Yes Nicky Miranda NP   allopurinoL (ZYLOPRIM) 100 mg tablet TAKE ONE TABLET BY MOUTH DAILY 8/17/21  Yes Nicky Miranda NP   pyridoxine, vitamin B6, (Vitamin B-6) 250 mg tablet Take 500 mg by mouth daily. 4/6/21  Yes Nicky Miranda NP   venlafaxine-SR [facility] 150 mg capsule Take 150 mg by mouth daily. 1/23/20  Yes Provider, Saad   clonazePAM (KLONOPIN) 2 mg tablet Take 2 mg by mouth three (3) times daily. Indications: PANIC DISORDER, QTY #90, Written on 8/18/16, Filled on 10/7/16. Prescriber Psychiatry Eileen Wadsworth NP        Review of Systems   Constitutional: Negative for chills and fever. Respiratory: Negative for shortness of breath. Cardiovascular: Negative for chest pain and palpitations. Musculoskeletal: Positive for neck pain. Neurological: Negative for dizziness and headaches.          Patient Active Problem List   Diagnosis Code    Poison sumac L23.7    Depression F32. A    Anxiety F41.9    Hip pain, chronic M25.559, G89.29    Avascular necrosis (HCC) M87.00    Tobacco abuse Z72.0    Acute deep vein thrombosis (DVT) of proximal vein of left lower extremity (HCC) I82.4Y2    RVA dissection with pseudoaneurysm I72.6     Patient Active Problem List    Diagnosis Date Noted    RVA dissection with pseudoaneurysm 2022    Acute deep vein thrombosis (DVT) of proximal vein of left lower extremity ([facility] 2021    Depression     Anxiety     Hip pain, chronic     Avascular necrosis (HCC)     Tobacco abuse     Poison sumac 2011     No Known Allergies  Past Medical History:   Diagnosis Date    Anxiety     Psychiatry Lois Stanleyencio Shorten Arthritis     Avascular necrosis ([facility]     Shaquille Pierson    Sandra Se Depression     Psychiatry Lois Stanleyencio Shorten Gout     Hip pain, chronic     [facility] Dr. Mikki Pierson     Hypertension     Tobacco abuse      Past Surgical History:   Procedure Laterality Date    HX HIP REPLACEMENT Left 2015    [facility] Dr. Mik[facility] Pierson     HX HIP REPLACEMENT Right 2015    [facility] Dr. Mik[facility] Pierson     HX WISDOM TEETH EXTRACTION       Family History   Problem Relation Age of Onset    Anxiety Mother     Heart Attack Father     Diabetes Brother     Heart Disease Paternal Grandmother     Diabetes Paternal Grandmother     Heart Disease Paternal Grandfather      Social History     Tobacco Use    Smoking status: Former Smoker     Packs/day: 0.50     Years: 20.00     Pack years: 10.00     Types: Cigarettes     Quit date: 2020     Years since quittin.5    Smokeless tobacco: Never Used   Substance Use Topics    Alcohol use: Yes     Comment: 1 case of beer a week          Objective:   BP (!) 162/119 (BP 1 Location: Left upper arm, BP Patient Position: Sitting)   Pulse (!) 113   Temp 97.9 °F (36.6 °C) (Temporal)   Resp 18   Ht 6' (1.829 m)   Wt 236 lb (107 kg)   SpO2 98%   BMI 32.01 kg/m²      Physical Exam  Constitutional:       Appearance: He is well-developed. HENT:      Head: Normocephalic and atraumatic. Cardiovascular:      Rate and Rhythm: Normal rate and regular rhythm. Heart sounds: Normal heart sounds. No murmur heard. No friction rub. No gallop. Pulmonary:      Effort: Pulmonary effort is normal.      Breath sounds: Normal breath sounds. No wheezing, rhonchi or rales. Abdominal:      General: Bowel sounds are normal.      Tenderness: There is no abdominal tenderness. Musculoskeletal:      Cervical back: Normal range of motion and neck supple. Muscular tenderness (right posterior neck) present. Skin:     General: Skin is warm and dry. Neurological:      Mental Status: He is alert and oriented to person, place, and time. We discussed the expected course, resolution and complications of the diagnosis(es) in detail. Medication risks, benefits, costs, interactions, and alternatives were discussed as indicated. I advised him to contact the office if his condition worsens, changes or fails to improve as anticipated. He expressed understanding with the diagnosis(es) and plan.      Gustavo Christine NP

## 2022-03-18 PROBLEM — I72.6: Status: ACTIVE | Noted: 2022-02-21

## 2022-03-18 PROBLEM — I72.6 VERTEBRAL ARTERY PSEUDOANEURYSM (HCC): Status: ACTIVE | Noted: 2022-02-21

## 2022-03-19 PROBLEM — I82.4Y2 ACUTE DEEP VEIN THROMBOSIS (DVT) OF PROXIMAL VEIN OF LEFT LOWER EXTREMITY (HCC): Status: ACTIVE | Noted: 2021-04-06

## 2022-03-25 ENCOUNTER — OFFICE VISIT (OUTPATIENT)
Dept: FAMILY MEDICINE CLINIC | Age: 42
End: 2022-03-25
Payer: COMMERCIAL

## 2022-03-25 VITALS
TEMPERATURE: 98.7 F | SYSTOLIC BLOOD PRESSURE: 136 MMHG | OXYGEN SATURATION: 97 % | RESPIRATION RATE: 20 BRPM | WEIGHT: 237.2 LBS | HEART RATE: 92 BPM | HEIGHT: 72 IN | DIASTOLIC BLOOD PRESSURE: 90 MMHG | BODY MASS INDEX: 32.13 KG/M2

## 2022-03-25 DIAGNOSIS — I72.6 VERTEBRAL ARTERY PSEUDOANEURYSM (HCC): ICD-10-CM

## 2022-03-25 DIAGNOSIS — I10 ESSENTIAL HYPERTENSION: Primary | ICD-10-CM

## 2022-03-25 PROCEDURE — 99213 OFFICE O/P EST LOW 20 MIN: CPT | Performed by: NURSE PRACTITIONER

## 2022-03-25 NOTE — PROGRESS NOTES
Depression Screening:  3 most recent PHQ Screens 3/4/2022   Little interest or pleasure in doing things Not at all   Feeling down, depressed, irritable, or hopeless Not at all   Total Score PHQ 2 0   Trouble falling or staying asleep, or sleeping too much -   Feeling tired or having little energy -   Poor appetite, weight loss, or overeating -   Feeling bad about yourself - or that you are a failure or have let yourself or your family down -   Trouble concentrating on things such as school, work, reading, or watching TV -   Moving or speaking so slowly that other people could have noticed; or the opposite being so fidgety that others notice -   Thoughts of being better off dead, or hurting yourself in some way -   PHQ 9 Score -   How difficult have these problems made it for you to do your work, take care of your home and get along with others -       Learning Assessment:  No flowsheet data found. Abuse Screening:  No flowsheet data found. Fall Risk  No flowsheet data found. ADL  No flowsheet data found. Travel Screening:    Travel Screening     Question   Response    In the last 10 days, have you been in contact with someone who was confirmed or suspected to have Coronavirus/COVID-19? No / Unsure    Have you had a COVID-19 viral test in the last 10 days? No    Do you have any of the following new or worsening symptoms? None of these    Have you traveled internationally or domestically in the last month? No      Travel History   Travel since 02/25/22    No documented travel since 02/25/22         Health Maintenance reviewed and discussed and ordered per Provider. Health Maintenance Due   Topic Date Due    Flu Vaccine (1) Never done    COVID-19 Vaccine (3 - Booster for Pfizer series) 09/23/2021    Lipid Screen  03/23/2022   .     Linda Buck presents today for   Chief Complaint   Patient presents with    Follow-up    Hypertension    Medication Refill       Is someone accompanying this pt? no    Is the patient using any DME equipment during OV? no    Coordination of Care:  1. Have you been to the ER, urgent care clinic since your last visit? Hospitalized since your last visit? no    2. Have you seen or consulted any other health care providers outside of the [address] [place] [address] since your last visit? Include any pap smears or colon screening.  no
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
## 2022-03-29 NOTE — TELEPHONE ENCOUNTER
These were last given by a hospitalist. Please sign if appropriate. Last Visit: 3/25/22 with ELIZABETH Blake  Next Appointment: 7/1/22 with ELIZABETH Blake  Previous Refill Encounter(s): 2/23/22 30 d/s    Requested Prescriptions     Pending Prescriptions Disp Refills    aspirin 81 mg chewable tablet 90 Tablet 1     Sig: Take 1 Tablet by mouth daily.  atorvastatin (LIPITOR) 40 mg tablet 90 Tablet 1     Sig: Take 1 Tablet by mouth nightly.  clopidogreL (Plavix) 75 mg tab 90 Tablet 1     Sig: Take 1 Tablet by mouth daily.

## 2022-04-04 ENCOUNTER — TELEPHONE (OUTPATIENT)
Dept: FAMILY MEDICINE CLINIC | Age: 42
End: 2022-04-04

## 2022-04-04 RX ORDER — CELECOXIB 200 MG/1
CAPSULE ORAL
Qty: 60 CAPSULE | Refills: 0 | Status: SHIPPED | OUTPATIENT
Start: 2022-04-04 | End: 2022-05-02

## 2022-04-04 RX ORDER — PANTOPRAZOLE SODIUM 40 MG/1
TABLET, DELAYED RELEASE ORAL
Qty: 90 TABLET | Refills: 0 | Status: SHIPPED
Start: 2022-04-04 | End: 2022-10-12

## 2022-04-04 NOTE — TELEPHONE ENCOUNTER
Patient states that his lopressor medication was working for his blood pressure however, he is starting to see the numbers increase slowly. He stated the provider told him to let him know if there is a change in his blood pressure. He would like a call back regarding this issue if possible to see what further actions should be taken.  Thank you!!!

## 2022-04-05 NOTE — TELEPHONE ENCOUNTER
Left message for patient to return call to the office. Need to know BP readings. And if bp is taken before or after medication (and how long after).

## 2022-04-05 NOTE — TELEPHONE ENCOUNTER
Pt returned nurse call, adv of nurse notes, pt states reading for early yesterday was 138/99 then 122/84, states took 1 1/2 pills then bp went down around 9pm to 119/78, only one reading for today at 6am for 163/110 before taking any medication.  Please adv [phone]

## 2022-04-08 RX ORDER — ATORVASTATIN CALCIUM 40 MG/1
40 TABLET, FILM COATED ORAL
Qty: 90 TABLET | Refills: 1 | Status: SHIPPED | OUTPATIENT
Start: 2022-04-08 | End: 2022-10-03

## 2022-04-08 RX ORDER — GUAIFENESIN 100 MG/5ML
81 LIQUID (ML) ORAL DAILY
Qty: 90 TABLET | Refills: 0 | Status: SHIPPED | OUTPATIENT
Start: 2022-04-08 | End: 2022-07-06

## 2022-04-08 RX ORDER — CLOPIDOGREL BISULFATE 75 MG/1
75 TABLET ORAL DAILY
Qty: 90 TABLET | Refills: 0 | Status: SHIPPED | OUTPATIENT
Start: 2022-04-08 | End: 2022-07-05

## 2022-04-14 ENCOUNTER — PATIENT MESSAGE (OUTPATIENT)
Dept: FAMILY MEDICINE CLINIC | Age: 42
End: 2022-04-14

## 2022-04-14 RX ORDER — METOPROLOL TARTRATE 50 MG/1
50 TABLET ORAL EVERY 12 HOURS
Qty: 180 TABLET | Refills: 1 | Status: SHIPPED | OUTPATIENT
Start: 2022-04-14 | End: 2022-10-15

## 2022-04-22 ENCOUNTER — DOCUMENTATION ONLY (OUTPATIENT)
Dept: PULMONOLOGY | Age: 42
End: 2022-04-22

## 2022-04-22 NOTE — PROGRESS NOTES
Lf vm for pt to speak w/Karli in regs to np pulm apt ref by Dr Narciso Mcelroy for alpha-1 deficiency-per referral schedule with Dr. Ester Lechuga; any pfts, cts, cxrs? ?

## 2022-05-18 ENCOUNTER — OFFICE VISIT (OUTPATIENT)
Dept: PULMONOLOGY | Age: 42
End: 2022-05-18
Payer: COMMERCIAL

## 2022-05-18 DIAGNOSIS — Z87.891 HISTORY OF TOBACCO ABUSE: ICD-10-CM

## 2022-05-18 DIAGNOSIS — E88.01 ALPHA-1-ANTITRYPSIN DEFICIENCY (HCC): Primary | ICD-10-CM

## 2022-05-18 PROCEDURE — 99204 OFFICE O/P NEW MOD 45 MIN: CPT | Performed by: INTERNAL MEDICINE

## 2022-05-18 NOTE — LETTER
5/31/2022    Patient: Terrell Valdes   YOB: 1980   Date of Visit: 5/18/2022     Raza Nascimento NP  [address]  [address]  [address] [facility]    Dear Raza Nascimento NP,      Thank you for referring Mr. Bob Francis to [address] [place] [address] for evaluation. My notes for this consultation are attached. If you have questions, please do not hesitate to call me. I look forward to following your patient along with you.       Sincerely,    Riana Hendrickson, DO
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
## 2022-05-18 NOTE — PROGRESS NOTES
Bonnie Kincaid presents today for   Chief Complaint   Patient presents with    New Patient     Alpha 1 deficiency        Is someone accompanying this pt? No    Is the patient using any DME equipment during OV? No    -DME Company NA    Depression Screening:  3 most recent PHQ Screens 3/4/2022   Little interest or pleasure in doing things Not at all   Feeling down, depressed, irritable, or hopeless Not at all   Total Score PHQ 2 0   Trouble falling or staying asleep, or sleeping too much -   Feeling tired or having little energy -   Poor appetite, weight loss, or overeating -   Feeling bad about yourself - or that you are a failure or have let yourself or your family down -   Trouble concentrating on things such as school, work, reading, or watching TV -   Moving or speaking so slowly that other people could have noticed; or the opposite being so fidgety that others notice -   Thoughts of being better off dead, or hurting yourself in some way -   PHQ 9 Score -   How difficult have these problems made it for you to do your work, take care of your home and get along with others -       Learning Assessment:  No flowsheet data found. Abuse Screening:  No flowsheet data found. Fall Risk  No flowsheet data found. Coordination of Care:  1. Have you been to the ER, urgent care clinic since your last visit? Hospitalized since your last visit? No    2. Have you seen or consulted any other health care providers outside of the [address] [place] [address] since your last visit? Include any pap smears or colon screening.  No

## 2022-05-31 VITALS
OXYGEN SATURATION: 96 % | WEIGHT: 230.4 LBS | HEIGHT: 73 IN | DIASTOLIC BLOOD PRESSURE: 82 MMHG | HEART RATE: 93 BPM | BODY MASS INDEX: 30.54 KG/M2 | SYSTOLIC BLOOD PRESSURE: 136 MMHG | TEMPERATURE: 97.8 F | RESPIRATION RATE: 18 BRPM

## 2022-06-23 RX ORDER — METOPROLOL TARTRATE 25 MG/1
TABLET, FILM COATED ORAL
Qty: 60 TABLET | Refills: 2 | OUTPATIENT
Start: 2022-06-23

## 2022-07-01 ENCOUNTER — OFFICE VISIT (OUTPATIENT)
Dept: FAMILY MEDICINE CLINIC | Age: 42
End: 2022-07-01
Payer: COMMERCIAL

## 2022-07-01 VITALS
OXYGEN SATURATION: 98 % | SYSTOLIC BLOOD PRESSURE: 136 MMHG | RESPIRATION RATE: 18 BRPM | HEART RATE: 75 BPM | HEIGHT: 73 IN | TEMPERATURE: 98.6 F | WEIGHT: 234.6 LBS | DIASTOLIC BLOOD PRESSURE: 86 MMHG | BODY MASS INDEX: 31.09 KG/M2

## 2022-07-01 DIAGNOSIS — R79.1 ABNORMALLY PROLONGED CLOTTING TIME: ICD-10-CM

## 2022-07-01 DIAGNOSIS — R73.09 ELEVATED GLUCOSE: ICD-10-CM

## 2022-07-01 DIAGNOSIS — I10 ESSENTIAL HYPERTENSION: Primary | ICD-10-CM

## 2022-07-01 DIAGNOSIS — E29.1 HYPOGONADISM MALE: ICD-10-CM

## 2022-07-01 PROCEDURE — 99213 OFFICE O/P EST LOW 20 MIN: CPT | Performed by: NURSE PRACTITIONER

## 2022-07-01 NOTE — PATIENT INSTRUCTIONS
[facility]  [address], [address]  [phone]

## 2022-07-01 NOTE — PROGRESS NOTES
1. \"Have you been to the ER, urgent care clinic since your last visit? Hospitalized since your last visit? \" No    2. \"Have you seen or consulted any other health care providers outside of the [address] [place] [address] since your last visit? \" No     3. For patients aged 39-70: Has the patient had a colonoscopy / FIT/ Cologuard?  NA - based on age

## 2022-07-01 NOTE — PROGRESS NOTES
Una Duran is a 43 y.o. male who was seen in clinic today (7/1/2022) for Hypertension and Other (Low testosterone)  . Assessment & Plan:   Diagnoses and all orders for this visit:    1. Essential hypertension    2. Abnormally prolonged clotting time  -     REFERRAL TO HEMATOLOGY ONCOLOGY    3. Elevated glucose    4. Hypogonadism male    Other orders  -     METABOLIC PANEL, COMPREHENSIVE  -     LIPID PANEL  -     TESTOSTERONE, FREE & TOTAL  -     FSH AND LH  -     HEMOGLOBIN A1C WITH EAG      Reinforced to have spouse wait for at least 1 hr after he comes home from work to check blood pressure. States he is going to have labs drawn tomorrow at [facility] before 0800. Will inform of labs once received. I have discussed the diagnosis with the patient and the intended plan as seen in the above orders. The patient has received an after-visit summary and questions were answered concerning future plans. I have discussed medication side effects and warnings with the patient as well. Patient agreeable with above plan and verbalizes understanding. Follow-up and Dispositions    · Return in about 5 months (around 12/1/2022) for HTN/HLD, in office follow up, fasting labs 1 wk prior. Subjective:   Hypertension ROS: taking medications as instructed, no medication side effects noted, home BP monitoring is performed, bp log reviewed in detail, no TIA's, no chest pain on exertion, no dyspnea on exertion, no swelling of ankles. 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 Patient would like to get checked for low testosterone. He does have ED. Reports his testosterone has been low in the past.  Patient reports he wasn't treated as he was being seen by [facility].     Lab Results   Component Value Date/Time    Sodium 138 03/04/2022 02:48 PM    Potassium 4.1 03/04/2022 02:48 PM    Chloride 103 03/04/2022 02:48 PM    CO2 31 03/04/2022 02:48 PM    Anion gap 4 03/04/2022 02:48 PM    Glucose 84 03/04/2022 02:48 PM    BUN 21 (H) 03/04/2022 02:48 PM    Creatinine 1.02 03/04/2022 02:48 PM    BUN/Creatinine ratio 21 (H) 03/04/2022 02:48 PM    GFR est AA >60 03/04/2022 02:48 PM    GFR est non-AA >60 03/04/2022 02:48 PM    Calcium 9.9 03/04/2022 02:48 PM    Bilirubin, total 0.3 03/23/2021 03:44 PM    Alk. phosphatase 91 03/23/2021 03:44 PM    Protein, total 7.9 03/23/2021 03:44 PM    Albumin 4.2 03/23/2021 03:44 PM    Globulin 3.7 03/23/2021 03:44 PM    A-G Ratio 1.1 03/23/2021 03:44 PM    ALT (SGPT) 40 03/23/2021 03:44 PM    AST (SGOT) 29 03/23/2021 03:44 PM     Lab Results   Component Value Date/Time    Cholesterol, total 211 (H) 03/23/2021 03:44 PM    HDL Cholesterol 63 (H) 03/23/2021 03:44 PM    LDL, calculated 97.4 03/23/2021 03:44 PM    VLDL, calculated 50.6 03/23/2021 03:44 PM    Triglyceride 253 (H) 03/23/2021 03:44 PM    CHOL/HDL Ratio 3.3 03/23/2021 03:44 PM     Lab Results   Component Value Date/Time    Hemoglobin A1c 5.5 05/27/2020 04:40 PM       Lab Results   Component Value Date/Time    WBC 12.0 03/04/2022 02:48 PM    HGB 13.5 03/04/2022 02:48 PM    HCT 40.7 03/04/2022 02:48 PM    PLATELET 466 56/91/7919 02:48 PM    MCV 91.7 03/04/2022 02:48 PM       Wt Readings from Last 3 Encounters:   07/01/22 234 lb 9.6 oz (106.4 kg)   05/18/22 230 lb 6.4 oz (104.5 kg)   03/25/22 237 lb 3.2 oz (107.6 kg)     Temp Readings from Last 3 Encounters:   07/01/22 98.6 °F (37 °C) (Temporal)   05/18/22 97.8 °F (36.6 °C) (Temporal)   03/25/22 98.7 °F (37.1 °C) (Temporal)     BP Readings from Last 3 Encounters:   07/01/22 (!) 135/91   05/18/22 136/82   03/25/22 (!) 136/90     Pulse Readings from Last 3 Encounters:   07/01/22 75   05/18/22 93   03/25/22 92       Prior to Admission medications    Medication Sig Start Date End Date Taking?  Authorizing Provider   celecoxib (CELEBREX) 200 mg capsule TAKE ONE CAPSULE BY MOUTH TWICE A DAY AS NEEDED FOR PAIN 5/12/22  Yes Cornell Roca NP   metoprolol tartrate (LOPRESSOR) 50 mg tablet Take 1 Tablet by mouth every twelve (12) hours. 4/14/22  Yes Cornell Roca NP   aspirin 81 mg chewable tablet Take 1 Tablet by mouth daily. 4/8/22  Yes Cornell Roca NP   atorvastatin (LIPITOR) 40 mg tablet Take 1 Tablet by mouth nightly. 4/8/22  Yes Cornell Roca NP   clopidogreL (Plavix) 75 mg tab Take 1 Tablet by mouth daily. 4/8/22  Yes Cornell Roca NP   pantoprazole (PROTONIX) 40 mg tablet TAKE ONE TABLET BY MOUTH DAILY BEFORE BREAKFAST 4/4/22  Yes Cornell Roca NP   hydroCHLOROthiazide (HYDRODIURIL) 25 mg tablet TAKE ONE TABLET BY MOUTH DAILY 12/31/21  Yes Cornell Roca NP   allopurinoL (ZYLOPRIM) 100 mg tablet TAKE ONE TABLET BY MOUTH DAILY 8/17/21  Yes Cornell Roca NP   pyridoxine, vitamin B6, (Vitamin B-6) 250 mg tablet Take 500 mg by mouth daily. 4/6/21  Yes Cornell Roca NP   venlafaxine-SR [facility] 150 mg capsule Take 150 mg by mouth daily. 1/23/20  Yes Provider, Saad   clonazePAM (KLONOPIN) 2 mg tablet Take 2 mg by mouth three (3) times daily. Indications: PANIC DISORDER, QTY #90, Written on 8/18/16, Filled on 10/7/16. Prescriber Psychiatry [place] [address] Jamie Atkinson, NP   cyclobenzaprine (FLEXERIL) 10 mg tablet Take 0.5 Tablets by mouth three (3) times daily as needed for Muscle Spasm(s). Patient not taking: Reported on 5/18/2022 2/23/22   Marylin Carrel, MD   lidocaine (LIDODERM) 5 % Apply patch to the affected area for 12 hours a day and remove for 12 hours a day. Patient not taking: Reported on 5/18/2022 2/23/22   Marylin Carrel, MD       The following sections were reviewed & updated as appropriate: PMH, PSH, FH, and SH. Review of Systems   Constitutional: Negative for activity change, appetite change, chills, fatigue and fever. Respiratory: Negative for chest tightness and shortness of breath.     Cardiovascular: Negative for chest pain and leg swelling. Neurological: Negative for dizziness and headaches. Objective:     Visit Vitals  /86 (BP 1 Location: Right upper arm, BP Patient Position: Sitting, BP Cuff Size: Adult)   Pulse 75   Temp 98.6 °F (37 °C) (Temporal)   Resp 18   Ht 6' 1\" (1.854 m)   Wt 234 lb 9.6 oz (106.4 kg)   SpO2 98%   BMI 30.95 kg/m²      Physical Exam  Constitutional:       General: He is not in acute distress. Appearance: He is well-developed. He is not diaphoretic. HENT:      Head: Normocephalic and atraumatic. Neck:      Vascular: No carotid bruit. Cardiovascular:      Rate and Rhythm: Normal rate and regular rhythm. Heart sounds: Normal heart sounds. No murmur heard. No friction rub. No gallop. Pulmonary:      Effort: Pulmonary effort is normal.      Breath sounds: Normal breath sounds. No stridor. No wheezing or rales. Abdominal:      General: Bowel sounds are normal.      Palpations: Abdomen is soft. Tenderness: There is no abdominal tenderness. Musculoskeletal:      Cervical back: Normal range of motion and neck supple. Lymphadenopathy:      Cervical: No cervical adenopathy. Skin:     General: Skin is warm and dry. Neurological:      Mental Status: He is alert and oriented to person, place, and time. Disclaimer: The patient understands our medical plan. Alternatives have been explained and offered. The risks, benefits and significant side effects of all medications have been reviewed. Anticipated time course and progression of condition reviewed. All questions have been addressed. He is encouraged to employ the information provided in the after visit summary, which was reviewed. Where applicable, he is instructed to call the clinic if he has not been notified either by phone or through [address] with the results of his tests or with an appointment plan for any referrals within 1 week(s). No news is not good news; it's no news.  The patient  is to call if his condition worsens or fails to improve or if significant side effects are experienced. Aspects of this note may have been generated using voice recognition software. Despite editing, there may be unrecognized errors.        Rosaline Osler, NP

## 2022-07-02 ENCOUNTER — HOSPITAL ENCOUNTER (OUTPATIENT)
Dept: LAB | Age: 42
Discharge: HOME | End: 2022-07-02

## 2022-07-02 DIAGNOSIS — R73.09 ELEVATED GLUCOSE: ICD-10-CM

## 2022-07-02 DIAGNOSIS — E29.1 HYPOGONADISM MALE: ICD-10-CM

## 2022-07-02 DIAGNOSIS — I10 ESSENTIAL HYPERTENSION: ICD-10-CM

## 2022-07-02 DIAGNOSIS — E88.01 ALPHA-1-ANTITRYPSIN DEFICIENCY (HCC): ICD-10-CM

## 2022-07-02 LAB
XX-LABCORP SPECIMEN COL,LCBCF: NORMAL

## 2022-07-02 PROCEDURE — 99001 SPECIMEN HANDLING PT-LAB: CPT

## 2022-07-05 LAB
ALBUMIN SERPL-MCNC: 4.4 G/DL (ref 4–5)
ALBUMIN/GLOB SERPL: 1.6 {RATIO} (ref 1.2–2.2)
ALP SERPL-CCNC: 95 IU/L (ref 44–121)
ALT SERPL-CCNC: 48 IU/L (ref 0–44)
AST SERPL-CCNC: 39 IU/L (ref 0–40)
BILIRUB SERPL-MCNC: <0.2 MG/DL (ref 0–1.2)
BUN SERPL-MCNC: 13 MG/DL (ref 6–24)
BUN/CREAT SERPL: 15 (ref 9–20)
CALCIUM SERPL-MCNC: 9.7 MG/DL (ref 8.7–10.2)
CHLORIDE SERPL-SCNC: 102 MMOL/L (ref 96–106)
CHOLEST SERPL-MCNC: 171 MG/DL (ref 100–199)
CO2 SERPL-SCNC: 20 MMOL/L (ref 20–29)
CREAT SERPL-MCNC: 0.88 MG/DL (ref 0.76–1.27)
EGFR: 110 ML/MIN/1.73
EST. AVERAGE GLUCOSE BLD GHB EST-MCNC: 114 MG/DL
FSH SERPL-ACNC: 1.7 MIU/ML (ref 1.5–12.4)
GLOBULIN SER CALC-MCNC: 2.7 G/DL (ref 1.5–4.5)
GLUCOSE SERPL-MCNC: 102 MG/DL (ref 65–99)
HBA1C MFR BLD: 5.6 % (ref 4.8–5.6)
HDLC SERPL-MCNC: 37 MG/DL
LDLC SERPL CALC-MCNC: 56 MG/DL (ref 0–99)
LH SERPL-ACNC: 1.9 MIU/ML (ref 1.7–8.6)
POTASSIUM SERPL-SCNC: 4.4 MMOL/L (ref 3.5–5.2)
PROT SERPL-MCNC: 7.1 G/DL (ref 6–8.5)
SODIUM SERPL-SCNC: 139 MMOL/L (ref 134–144)
TESTOST FREE SERPL-MCNC: 11.2 PG/ML (ref 6.8–21.5)
TESTOST SERPL-MCNC: 215 NG/DL (ref 264–916)
TRIGL SERPL-MCNC: 524 MG/DL (ref 0–149)
VLDLC SERPL CALC-MCNC: 78 MG/DL (ref 5–40)

## 2022-07-05 RX ORDER — CLOPIDOGREL BISULFATE 75 MG/1
TABLET ORAL
Qty: 90 TABLET | Refills: 0 | Status: SHIPPED | OUTPATIENT
Start: 2022-07-05 | End: 2022-10-12 | Stop reason: SDUPTHER

## 2022-07-06 RX ORDER — GUAIFENESIN 100 MG/5ML
LIQUID (ML) ORAL
Qty: 90 TABLET | Refills: 0 | Status: SHIPPED | OUTPATIENT
Start: 2022-07-06 | End: 2022-10-12 | Stop reason: SDUPTHER

## 2022-07-07 ENCOUNTER — PATIENT MESSAGE (OUTPATIENT)
Dept: FAMILY MEDICINE CLINIC | Age: 42
End: 2022-07-07

## 2022-07-07 DIAGNOSIS — E29.1 HYPOGONADISM MALE: Primary | ICD-10-CM

## 2022-07-10 DIAGNOSIS — M10.00 ACUTE IDIOPATHIC GOUT, UNSPECIFIED SITE: ICD-10-CM

## 2022-07-11 RX ORDER — ALLOPURINOL 100 MG/1
TABLET ORAL
Qty: 90 TABLET | Refills: 2 | Status: SHIPPED | OUTPATIENT
Start: 2022-07-11

## 2022-07-20 RX ORDER — OMEGA-3-ACID ETHYL ESTERS 1 G/1
2 CAPSULE, LIQUID FILLED ORAL 2 TIMES DAILY
Qty: 360 CAPSULE | Refills: 0 | Status: SHIPPED | OUTPATIENT
Start: 2022-07-20 | End: 2022-10-19

## 2022-07-26 ENCOUNTER — HOSPITAL ENCOUNTER (OUTPATIENT)
Dept: CT IMAGING | Age: 42
Discharge: HOME | End: 2022-07-26
Attending: INTERNAL MEDICINE
Payer: COMMERCIAL

## 2022-07-26 DIAGNOSIS — E88.01 ALPHA-1-ANTITRYPSIN DEFICIENCY (HCC): ICD-10-CM

## 2022-07-26 PROCEDURE — 71250 CT THORAX DX C-: CPT

## 2022-08-04 NOTE — TELEPHONE ENCOUNTER
Last Visit: 7/1/22 with NP Fartun Dickens  Next Appointment: Advised to follow-up in 5 months  Previous Refill Encounter(s): 5/12/22 #60 with 1 refill    Requested Prescriptions     Pending Prescriptions Disp Refills    celecoxib (CELEBREX) 200 mg capsule 60 Capsule 1     Sig: Take 1 Capsule by mouth two (2) times daily as needed for Pain. For [address] [facility] in place:   Recommendation Provided To:    Intervention Detail: New Rx: 1, reason: Patient Preference  Gap Closed?:   Intervention Accepted By:   Time Spent (min): 5

## 2022-08-06 RX ORDER — CELECOXIB 200 MG/1
200 CAPSULE ORAL
Qty: 60 CAPSULE | Refills: 1 | Status: SHIPPED
Start: 2022-08-06 | End: 2022-10-12

## 2022-08-17 ENCOUNTER — TELEPHONE (OUTPATIENT)
Dept: FAMILY MEDICINE CLINIC | Age: 42
End: 2022-08-17

## 2022-08-17 NOTE — TELEPHONE ENCOUNTER
----- Message from Kemi Rather sent at 8/16/2022 12:13 PM EDT -----  Subject: Appointment Request    Reason for Call: Established Patient Appointment needed: Urgent (Patient   Request) No Script    QUESTIONS    Reason for appointment request? Available appointments did not meet   patient need     Additional Information for Provider?  Pt need a call back for white blood   count   ---------------------------------------------------------------------------  --------------  Moo Mathew INFO  [phone]; OK to leave message on voicemail  ---------------------------------------------------------------------------  --------------  SCRIPT ANSWERS  COVID Screen: Jonathan Madrigal

## 2022-08-24 NOTE — TELEPHONE ENCOUNTER
Last Visit: 7/1/22 with ELIZABETH Reyes  Next Appointment: 8/25/22 with ELIZABETH Reyes  Previous Refill Encounter(s): 7/22/22 #30    Requested Prescriptions     Pending Prescriptions Disp Refills    hydroCHLOROthiazide (HYDRODIURIL) 25 mg tablet 90 Tablet 1     Sig: Take 1 Tablet by mouth daily. For [address] in place:   Recommendation Provided To:    Intervention Detail: New Rx: 1, reason: Patient Preference  Gap Closed?:   Intervention Accepted By:   Time Spent (min): 5

## 2022-08-25 ENCOUNTER — OFFICE VISIT (OUTPATIENT)
Dept: FAMILY MEDICINE CLINIC | Age: 42
End: 2022-08-25
Payer: COMMERCIAL

## 2022-08-25 VITALS
RESPIRATION RATE: 18 BRPM | BODY MASS INDEX: 31.6 KG/M2 | HEIGHT: 73 IN | HEART RATE: 88 BPM | WEIGHT: 238.4 LBS | SYSTOLIC BLOOD PRESSURE: 137 MMHG | OXYGEN SATURATION: 97 % | DIASTOLIC BLOOD PRESSURE: 89 MMHG | TEMPERATURE: 98.2 F

## 2022-08-25 DIAGNOSIS — D72.829 LEUKOCYTOSIS, UNSPECIFIED TYPE: Primary | ICD-10-CM

## 2022-08-25 PROCEDURE — 99213 OFFICE O/P EST LOW 20 MIN: CPT | Performed by: NURSE PRACTITIONER

## 2022-08-25 NOTE — PROGRESS NOTES
1. \"Have you been to the ER, urgent care clinic since your last visit? Hospitalized since your last visit? \" No    2. \"Have you seen or consulted any other health care providers outside of the [address] [place] [address] since your last visit? \" No     3. For patients aged 39-70: Has the patient had a colonoscopy / FIT/ Cologuard?  NA - based on age

## 2022-08-25 NOTE — PROGRESS NOTES
Eva Newberry is a 43 y.o. male who was seen in clinic today (8/25/2022) for Abnormal Lab Results (Elevated white blood count. Need forms for work completed.)    Assessment & Plan:   Diagnoses and all orders for this visit:    1. Leukocytosis, unspecified type  -     REFERRAL TO HEMATOLOGY    Advised will have our office call [facility] and inquire if he can been sooner due to leukocytosis. Will call with response. Subjective:   Patient had routine physical through his employer and his WBC count was noted to be elevated at 12.63 on 4/11/2022 and his repeat WBC on 4/25/2022 was 13.39. Patient's employer is is requesting evaluation to rule out cancer or bone marrow suppression. Patient has an appt scheduled next month with hematology for abnormally prolonged clotting time. Doesn't recall the date. Patient denies unexplained fever/fatigue. He does admit to bruising easier since being on plavix. Lab Results   Component Value Date/Time    Sodium 139 07/02/2022 08:30 AM    Potassium 4.4 07/02/2022 08:30 AM    Chloride 102 07/02/2022 08:30 AM    CO2 20 07/02/2022 08:30 AM    Anion gap 4 03/04/2022 02:48 PM    Glucose 102 (H) 07/02/2022 08:30 AM    BUN 13 07/02/2022 08:30 AM    Creatinine 0.88 07/02/2022 08:30 AM    BUN/Creatinine ratio 15 07/02/2022 08:30 AM    GFR est AA >60 03/04/2022 02:48 PM    GFR est non-AA >60 03/04/2022 02:48 PM    Calcium 9.7 07/02/2022 08:30 AM    Bilirubin, total <0.2 07/02/2022 08:30 AM    Alk.  phosphatase 95 07/02/2022 08:30 AM    Protein, total 7.1 07/02/2022 08:30 AM    Albumin 4.4 07/02/2022 08:30 AM    Globulin 3.7 03/23/2021 03:44 PM    A-G Ratio 1.6 07/02/2022 08:30 AM    ALT (SGPT) 48 (H) 07/02/2022 08:30 AM    AST (SGOT) 39 07/02/2022 08:30 AM     Lab Results   Component Value Date/Time    Cholesterol, total 171 07/02/2022 08:30 AM    HDL Cholesterol 37 (L) 07/02/2022 08:30 AM    LDL, calculated 56 07/02/2022 08:30 AM    LDL, calculated 97.4 03/23/2021 03:44 PM    VLDL, calculated 78 (H) 07/02/2022 08:30 AM    VLDL, calculated 50.6 03/23/2021 03:44 PM    Triglyceride 524 (H) 07/02/2022 08:30 AM    CHOL/HDL Ratio 3.3 03/23/2021 03:44 PM     Lab Results   Component Value Date/Time    Hemoglobin A1c 5.6 07/02/2022 08:30 AM      Lab Results   Component Value Date/Time    WBC 12.0 03/04/2022 02:48 PM    HGB 13.5 03/04/2022 02:48 PM    HCT 40.7 03/04/2022 02:48 PM    PLATELET 365 93/05/4175 02:48 PM    MCV 91.7 03/04/2022 02:48 PM       Wt Readings from Last 3 Encounters:   08/25/22 238 lb 6.4 oz (108.1 kg)   07/01/22 234 lb 9.6 oz (106.4 kg)   05/18/22 230 lb 6.4 oz (104.5 kg)     Temp Readings from Last 3 Encounters:   08/25/22 98.2 °F (36.8 °C) (Temporal)   07/01/22 98.6 °F (37 °C) (Temporal)   05/18/22 97.8 °F (36.6 °C) (Temporal)     BP Readings from Last 3 Encounters:   08/25/22 137/89   07/01/22 136/86   05/18/22 136/82     Pulse Readings from Last 3 Encounters:   08/25/22 88   07/01/22 75   05/18/22 93     Prior to Admission medications    Medication Sig Start Date End Date Taking? Authorizing Provider   hydroCHLOROthiazide (HYDRODIURIL) 25 mg tablet Take 1 Tablet by mouth in the morning. 7/22/22  Yes Cyn Sidhu NP   omega-3 acid ethyl esters (LOVAZA) 1 gram capsule Take 2 Capsules by mouth two (2) times a day. 7/20/22  Yes Lester Sheikh NP   allopurinoL (ZYLOPRIM) 100 mg tablet TAKE ONE TABLET BY MOUTH DAILY 7/11/22  Yes Lester Sheikh NP   aspirin 81 mg chewable tablet CHEW ONE TABLET BY MOUTH DAILY 7/6/22  Yes Lester Sheikh NP   clopidogreL (PLAVIX) 75 mg tab TAKE ONE TABLET BY MOUTH DAILY 7/5/22  Yes Lester Sheikh NP   metoprolol tartrate (LOPRESSOR) 50 mg tablet Take 1 Tablet by mouth every twelve (12) hours. 4/14/22  Yes Lester Sheikh NP   atorvastatin (LIPITOR) 40 mg tablet Take 1 Tablet by mouth nightly.  4/8/22  Yes Lester Sheikh NP   pantoprazole (PROTONIX) 40 mg tablet TAKE ONE TABLET BY MOUTH DAILY BEFORE BREAKFAST 4/4/22  Yes Lester Sheikh NP pyridoxine, vitamin B6, (Vitamin B-6) 250 mg tablet Take 500 mg by mouth daily. 4/6/21  Yes Christiano Gutiérrez NP   venlafaxine-SR [facility] 150 mg capsule Take 150 mg by mouth daily. 1/23/20  Yes Provider, Historical   clonazePAM (KLONOPIN) 2 mg tablet Take 2 mg by mouth three (3) times daily. Indications: PANIC DISORDER, QTY #90, Written on 8/18/16, Filled on 10/7/16. Prescriber Psychiatry [place] [address] [address]ito [address]   Yes Lily Rouse NP   celecoxib (CELEBREX) 200 mg capsule Take 1 Capsule by mouth two (2) times daily as needed for Pain. Patient not taking: Reported on 8/25/2022 8/6/22   Christiano Gutiérrez NP     The following sections were reviewed & updated as appropriate: PMH, PSH, FH, and SH. Review of Systems   Constitutional:  Negative for activity change, appetite change, chills, fatigue and fever. Respiratory:  Negative for chest tightness and shortness of breath. Cardiovascular:  Negative for chest pain and leg swelling. Neurological:  Negative for dizziness and headaches. Objective:   Visit Vitals  /89 (BP 1 Location: Left upper arm, BP Patient Position: Sitting, BP Cuff Size: Large adult)   Pulse 88   Temp 98.2 °F (36.8 °C) (Temporal)   Resp 18   Ht 6' 1\" (1.854 m)   Wt 238 lb 6.4 oz (108.1 kg)   SpO2 97%   BMI 31.45 kg/m²      Physical Exam  Constitutional:       General: He is not in acute distress. Appearance: He is well-developed. He is not diaphoretic. HENT:      Head: Normocephalic and atraumatic. Neck:      Vascular: No carotid bruit. Cardiovascular:      Rate and Rhythm: Normal rate and regular rhythm. Heart sounds: Normal heart sounds. No murmur heard. No friction rub. No gallop. Pulmonary:      Effort: Pulmonary effort is normal.      Breath sounds: Normal breath sounds. No stridor. No wheezing or rales. Musculoskeletal:      Cervical back: Normal range of motion and neck supple. Lymphadenopathy:      Cervical: No cervical adenopathy.    Skin:     General: Skin is warm and dry. Neurological:      Mental Status: He is alert and oriented to person, place, and time. Disclaimer: The patient understands our medical plan. Alternatives have been explained and offered. The risks, benefits and significant side effects of all medications have been reviewed. Anticipated time course and progression of condition reviewed. All questions have been addressed. He is encouraged to employ the information provided in the after visit summary, which was reviewed. Where applicable, he is instructed to call the clinic if he has not been notified either by phone or through [address] with the results of his tests or with an appointment plan for any referrals within 1 week(s). No news is not good news; it's no news. The patient  is to call if his condition worsens or fails to improve or if significant side effects are experienced. Aspects of this note may have been generated using voice recognition software. Despite editing, there may be unrecognized errors.        Rupesh Han NP 0

## 2022-08-26 ENCOUNTER — TELEPHONE (OUTPATIENT)
Dept: FAMILY MEDICINE CLINIC | Age: 42
End: 2022-08-26

## 2022-08-26 NOTE — TELEPHONE ENCOUNTER
Ino Brown from [address] [place] [address] Taylor back about their conversation yesterday. She is wondering if the patient could be seen at a different location if they are able to get him in sooner. Requesting a call back. Please advise.      [phone]  Direct Line

## 2022-08-30 RX ORDER — HYDROCHLOROTHIAZIDE 25 MG/1
25 TABLET ORAL DAILY
Qty: 90 TABLET | Refills: 1 | Status: SHIPPED | OUTPATIENT
Start: 2022-08-30

## 2022-08-30 NOTE — TELEPHONE ENCOUNTER
Spoke with patient in regards to [employer]. Patient stated he would prefer to stay closer to this side of town but would be ok with going to another location. Left Jarad Price a voice message to contact the office to give her this information.

## 2022-08-30 NOTE — TELEPHONE ENCOUNTER
Spoke with Cristina Isaac who stated she tried to offer the patient 2 sooner appointments but patient stated he has something scheduled both days. Cristina Isaac stated they would let the patient know if anything came up sooner.

## 2022-09-16 ENCOUNTER — OFFICE VISIT (OUTPATIENT)
Dept: PULMONOLOGY | Age: 42
End: 2022-09-16
Payer: COMMERCIAL

## 2022-09-16 VITALS — BODY MASS INDEX: 31.56 KG/M2 | HEIGHT: 72 IN | WEIGHT: 233 LBS

## 2022-09-16 DIAGNOSIS — E88.01 ALPHA-1-ANTITRYPSIN DEFICIENCY (HCC): ICD-10-CM

## 2022-09-16 DIAGNOSIS — Z87.891 HISTORY OF TOBACCO ABUSE: ICD-10-CM

## 2022-09-16 PROCEDURE — 94727 GAS DIL/WSHOT DETER LNG VOL: CPT | Performed by: INTERNAL MEDICINE

## 2022-09-16 PROCEDURE — 94729 DIFFUSING CAPACITY: CPT | Performed by: INTERNAL MEDICINE

## 2022-09-16 PROCEDURE — 94060 EVALUATION OF WHEEZING: CPT | Performed by: INTERNAL MEDICINE

## 2022-09-23 ENCOUNTER — OFFICE VISIT (OUTPATIENT)
Dept: PULMONOLOGY | Age: 42
End: 2022-09-23
Payer: COMMERCIAL

## 2022-09-23 VITALS
DIASTOLIC BLOOD PRESSURE: 85 MMHG | TEMPERATURE: 97.3 F | HEIGHT: 73 IN | WEIGHT: 232.9 LBS | OXYGEN SATURATION: 98 % | RESPIRATION RATE: 18 BRPM | HEART RATE: 88 BPM | SYSTOLIC BLOOD PRESSURE: 133 MMHG | BODY MASS INDEX: 30.87 KG/M2

## 2022-09-23 DIAGNOSIS — J44.9 OBSTRUCTIVE LUNG DISEASE (HCC): Primary | ICD-10-CM

## 2022-09-23 DIAGNOSIS — Z87.891 HISTORY OF TOBACCO ABUSE: ICD-10-CM

## 2022-09-23 PROCEDURE — 99213 OFFICE O/P EST LOW 20 MIN: CPT | Performed by: INTERNAL MEDICINE

## 2022-09-23 NOTE — LETTER
10/3/2022    Patient: Jaida Knight   YOB: 1980   Date of Visit: 9/23/2022     Kahlil Mcgrath NP  [address]  [address] [place]  [address] [facility]    Dear Kahlil Mcgrath NP,      Thank you for referring Mr. Robin Casanova to [address] [place] [address] for evaluation. My notes for this consultation are attached. If you have questions, please do not hesitate to call me. I look forward to following your patient along with you.       Sincerely,    Lio Delvalle, DO

## 2022-09-23 NOTE — PROGRESS NOTES
Mao Gomes presents today for   Chief Complaint   Patient presents with    Other     Alpha 1    Cough     Recurrent Bronchitis        Is someone accompanying this pt? No    Is the patient using any DME equipment during OV? No    -DME Company nA    Depression Screening:  3 most recent PHQ Screens 8/25/2022   Little interest or pleasure in doing things Not at all   Feeling down, depressed, irritable, or hopeless Not at all   Total Score PHQ 2 0   Trouble falling or staying asleep, or sleeping too much -   Feeling tired or having little energy -   Poor appetite, weight loss, or overeating -   Feeling bad about yourself - or that you are a failure or have let yourself or your family down -   Trouble concentrating on things such as school, work, reading, or watching TV -   Moving or speaking so slowly that other people could have noticed; or the opposite being so fidgety that others notice -   Thoughts of being better off dead, or hurting yourself in some way -   PHQ 9 Score -   How difficult have these problems made it for you to do your work, take care of your home and get along with others -       Learning Assessment:  No flowsheet data found. Abuse Screening:  No flowsheet data found. Fall Risk  No flowsheet data found. Coordination of Care:  1. Have you been to the ER, urgent care clinic since your last visit? Hospitalized since your last visit? No    2. Have you seen or consulted any other health care providers outside of the [address] [place] [address] since your last visit? Include any pap smears or colon screening.  No    Medication
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
## 2022-10-03 RX ORDER — ATORVASTATIN CALCIUM 40 MG/1
TABLET, FILM COATED ORAL
Qty: 90 TABLET | Refills: 1 | Status: SHIPPED | OUTPATIENT
Start: 2022-10-03

## 2022-10-12 NOTE — TELEPHONE ENCOUNTER
Last Visit: 8/25/22 with NP Spring Jaimes  Next Appointment: none  Previous Refill Encounter(s): 7/5/22 Plavix #90, 7/6/22 ASA #90 4/14/22 Lopressor #180 with 1 refill    Requested Prescriptions     Pending Prescriptions Disp Refills    metoprolol tartrate (LOPRESSOR) 50 mg tablet [Pharmacy Med Name: METOPROLOL TARTRATE 50 MG TAB] 180 Tablet 1     Sig: Take 1 Tablet by mouth every twelve (12) hours. clopidogreL (PLAVIX) 75 mg tab 90 Tablet 1     Sig: Take 1 Tablet by mouth daily. aspirin 81 mg chewable tablet 90 Tablet 1     Sig: Take 1 Tablet by mouth daily. For [facility] in place:   Recommendation Provided To:    Intervention Detail: New Rx: 3, reason: Patient Preference  Gap Closed?:   Intervention Accepted By:   Time Spent (min): 5

## 2022-10-15 RX ORDER — CLOPIDOGREL BISULFATE 75 MG/1
75 TABLET ORAL DAILY
Qty: 90 TABLET | Refills: 1 | Status: SHIPPED | OUTPATIENT
Start: 2022-10-15

## 2022-10-15 RX ORDER — METOPROLOL TARTRATE 50 MG/1
50 TABLET ORAL EVERY 12 HOURS
Qty: 180 TABLET | Refills: 1 | Status: SHIPPED | OUTPATIENT
Start: 2022-10-15

## 2022-10-15 RX ORDER — GUAIFENESIN 100 MG/5ML
81 LIQUID (ML) ORAL DAILY
Qty: 90 TABLET | Refills: 1 | Status: SHIPPED | OUTPATIENT
Start: 2022-10-15

## 2022-11-22 ENCOUNTER — TRANSCRIBE ORDER (OUTPATIENT)
Dept: SCHEDULING | Age: 42
End: 2022-11-22

## 2022-11-22 DIAGNOSIS — D72.829 LEUKOCYTOSIS: Primary | ICD-10-CM

## 2022-12-05 ENCOUNTER — HOSPITAL ENCOUNTER (OUTPATIENT)
Dept: ULTRASOUND IMAGING | Age: 42
End: 2022-12-05
Attending: INTERNAL MEDICINE

## 2022-12-09 ENCOUNTER — HOSPITAL ENCOUNTER (OUTPATIENT)
Dept: ULTRASOUND IMAGING | Age: 42
End: 2022-12-09
Attending: INTERNAL MEDICINE
Payer: COMMERCIAL

## 2022-12-09 DIAGNOSIS — D72.829 LEUKOCYTOSIS: ICD-10-CM

## 2022-12-09 PROCEDURE — 76700 US EXAM ABDOM COMPLETE: CPT

## 2022-12-15 ENCOUNTER — HOSPITAL ENCOUNTER (OUTPATIENT)
Dept: GENERAL RADIOLOGY | Age: 42
Discharge: HOME | End: 2022-12-15
Payer: COMMERCIAL

## 2022-12-15 DIAGNOSIS — D72.828 ACQUIRED NEUTROPHILIA: ICD-10-CM

## 2022-12-15 DIAGNOSIS — D72.829 LEUKOCYTOSIS: ICD-10-CM

## 2022-12-15 DIAGNOSIS — D68.59 PRIMARY HYPERCOAGULABLE STATE (HCC): ICD-10-CM

## 2022-12-15 PROCEDURE — 70220 X-RAY EXAM OF SINUSES: CPT

## 2022-12-15 PROCEDURE — 71046 X-RAY EXAM CHEST 2 VIEWS: CPT

## 2023-05-04 RX ORDER — ALLOPURINOL 100 MG/1
100 TABLET ORAL DAILY
Qty: 90 TABLET | Refills: 3 | Status: SHIPPED | OUTPATIENT
Start: 2023-05-04

## 2023-05-10 ENCOUNTER — HOSPITAL ENCOUNTER (OUTPATIENT)
Facility: HOSPITAL | Age: 43
Setting detail: SPECIMEN
Discharge: HOME | End: 2023-05-13
Payer: COMMERCIAL

## 2023-05-10 DIAGNOSIS — R73.03 PREDIABETES: ICD-10-CM

## 2023-05-10 DIAGNOSIS — E29.1 TESTICULAR HYPOFUNCTION: ICD-10-CM

## 2023-05-10 DIAGNOSIS — I10 ESSENTIAL (PRIMARY) HYPERTENSION: ICD-10-CM

## 2023-05-10 DIAGNOSIS — I10 ESSENTIAL (PRIMARY) HYPERTENSION: Primary | ICD-10-CM

## 2023-05-10 DIAGNOSIS — E78.00 PURE HYPERCHOLESTEROLEMIA, UNSPECIFIED: ICD-10-CM

## 2023-05-10 LAB
ALBUMIN SERPL-MCNC: 4 G/DL (ref 3.4–5)
ALBUMIN/GLOB SERPL: 1.1 (ref 0.8–1.7)
ALP SERPL-CCNC: 96 U/L (ref 45–117)
ALT SERPL-CCNC: 58 U/L (ref 16–61)
ANION GAP SERPL CALC-SCNC: 6 MMOL/L (ref 3–18)
AST SERPL-CCNC: 45 U/L (ref 10–38)
BILIRUB SERPL-MCNC: 0.5 MG/DL (ref 0.2–1)
BUN SERPL-MCNC: 11 MG/DL (ref 7–18)
BUN/CREAT SERPL: 14 (ref 12–20)
CALCIUM SERPL-MCNC: 9.3 MG/DL (ref 8.5–10.1)
CHLORIDE SERPL-SCNC: 105 MMOL/L (ref 100–111)
CHOLEST SERPL-MCNC: 226 MG/DL
CO2 SERPL-SCNC: 29 MMOL/L (ref 21–32)
CREAT SERPL-MCNC: 0.8 MG/DL (ref 0.6–1.3)
EST. AVERAGE GLUCOSE BLD GHB EST-MCNC: 111 MG/DL
GLOBULIN SER CALC-MCNC: 3.5 G/DL (ref 2–4)
GLUCOSE SERPL-MCNC: 109 MG/DL (ref 74–99)
HBA1C MFR BLD: 5.5 % (ref 4.2–5.6)
HDLC SERPL-MCNC: 68 MG/DL (ref 40–60)
HDLC SERPL: 3.3 (ref 0–5)
LDLC SERPL CALC-MCNC: 138.2 MG/DL (ref 0–100)
LIPID PANEL: ABNORMAL
POTASSIUM SERPL-SCNC: 4.3 MMOL/L (ref 3.5–5.5)
PROT SERPL-MCNC: 7.5 G/DL (ref 6.4–8.2)
SODIUM SERPL-SCNC: 140 MMOL/L (ref 136–145)
TRIGL SERPL-MCNC: 99 MG/DL
VLDLC SERPL CALC-MCNC: 19.8 MG/DL

## 2023-05-10 PROCEDURE — 36415 COLL VENOUS BLD VENIPUNCTURE: CPT

## 2023-05-10 PROCEDURE — 80053 COMPREHEN METABOLIC PANEL: CPT

## 2023-05-10 PROCEDURE — 83036 HEMOGLOBIN GLYCOSYLATED A1C: CPT

## 2023-05-10 PROCEDURE — 80061 LIPID PANEL: CPT

## 2023-05-10 PROCEDURE — 83002 ASSAY OF GONADOTROPIN (LH): CPT

## 2023-05-10 PROCEDURE — 83001 ASSAY OF GONADOTROPIN (FSH): CPT

## 2023-05-11 LAB
FSH SERPL-ACNC: 1.6 MIU/ML
LH SERPL-ACNC: 1.7 MIU/ML

## 2023-05-17 ENCOUNTER — OFFICE VISIT (OUTPATIENT)
Age: 43
End: 2023-05-17
Payer: COMMERCIAL

## 2023-05-17 VITALS
SYSTOLIC BLOOD PRESSURE: 148 MMHG | RESPIRATION RATE: 18 BRPM | DIASTOLIC BLOOD PRESSURE: 94 MMHG | HEIGHT: 72 IN | OXYGEN SATURATION: 96 % | BODY MASS INDEX: 31.77 KG/M2 | HEART RATE: 77 BPM | WEIGHT: 234.6 LBS | TEMPERATURE: 98 F

## 2023-05-17 DIAGNOSIS — I10 ESSENTIAL (PRIMARY) HYPERTENSION: Primary | ICD-10-CM

## 2023-05-17 DIAGNOSIS — I72.6 ANEURYSM OF VERTEBRAL ARTERY (HCC): ICD-10-CM

## 2023-05-17 DIAGNOSIS — J44.9 CHRONIC OBSTRUCTIVE PULMONARY DISEASE, UNSPECIFIED COPD TYPE (HCC): ICD-10-CM

## 2023-05-17 DIAGNOSIS — E88.01 ALPHA-1-ANTITRYPSIN DEFICIENCY (HCC): ICD-10-CM

## 2023-05-17 PROCEDURE — 3074F SYST BP LT 130 MM HG: CPT | Performed by: NURSE PRACTITIONER

## 2023-05-17 PROCEDURE — 3078F DIAST BP <80 MM HG: CPT | Performed by: NURSE PRACTITIONER

## 2023-05-17 PROCEDURE — 99214 OFFICE O/P EST MOD 30 MIN: CPT | Performed by: NURSE PRACTITIONER

## 2023-05-17 RX ORDER — ATORVASTATIN CALCIUM 40 MG/1
40 TABLET, FILM COATED ORAL NIGHTLY
Qty: 90 TABLET | Refills: 1 | Status: SHIPPED | OUTPATIENT
Start: 2023-05-17

## 2023-05-17 RX ORDER — METOPROLOL SUCCINATE 100 MG/1
100 TABLET, EXTENDED RELEASE ORAL DAILY
Qty: 90 TABLET | Refills: 1 | Status: SHIPPED | OUTPATIENT
Start: 2023-05-17

## 2023-05-17 SDOH — ECONOMIC STABILITY: FOOD INSECURITY: WITHIN THE PAST 12 MONTHS, THE FOOD YOU BOUGHT JUST DIDN'T LAST AND YOU DIDN'T HAVE MONEY TO GET MORE.: SOMETIMES TRUE

## 2023-05-17 SDOH — ECONOMIC STABILITY: INCOME INSECURITY: HOW HARD IS IT FOR YOU TO PAY FOR THE VERY BASICS LIKE FOOD, HOUSING, MEDICAL CARE, AND HEATING?: HARD

## 2023-05-17 SDOH — ECONOMIC STABILITY: FOOD INSECURITY: WITHIN THE PAST 12 MONTHS, YOU WORRIED THAT YOUR FOOD WOULD RUN OUT BEFORE YOU GOT MONEY TO BUY MORE.: SOMETIMES TRUE

## 2023-05-17 SDOH — ECONOMIC STABILITY: HOUSING INSECURITY
IN THE LAST 12 MONTHS, WAS THERE A TIME WHEN YOU DID NOT HAVE A STEADY PLACE TO SLEEP OR SLEPT IN A SHELTER (INCLUDING NOW)?: NO

## 2023-05-17 ASSESSMENT — ANXIETY QUESTIONNAIRES
7. FEELING AFRAID AS IF SOMETHING AWFUL MIGHT HAPPEN: 0
3. WORRYING TOO MUCH ABOUT DIFFERENT THINGS: 0
2. NOT BEING ABLE TO STOP OR CONTROL WORRYING: 0
6. BECOMING EASILY ANNOYED OR IRRITABLE: 0
GAD7 TOTAL SCORE: 1
5. BEING SO RESTLESS THAT IT IS HARD TO SIT STILL: 0
1. FEELING NERVOUS, ANXIOUS, OR ON EDGE: 1
IF YOU CHECKED OFF ANY PROBLEMS ON THIS QUESTIONNAIRE, HOW DIFFICULT HAVE THESE PROBLEMS MADE IT FOR YOU TO DO YOUR WORK, TAKE CARE OF THINGS AT HOME, OR GET ALONG WITH OTHER PEOPLE: SOMEWHAT DIFFICULT
4. TROUBLE RELAXING: 0

## 2023-05-17 ASSESSMENT — PATIENT HEALTH QUESTIONNAIRE - PHQ9
9. THOUGHTS THAT YOU WOULD BE BETTER OFF DEAD, OR OF HURTING YOURSELF: 0
2. FEELING DOWN, DEPRESSED OR HOPELESS: 0
1. LITTLE INTEREST OR PLEASURE IN DOING THINGS: 0
7. TROUBLE CONCENTRATING ON THINGS, SUCH AS READING THE NEWSPAPER OR WATCHING TELEVISION: 0
6. FEELING BAD ABOUT YOURSELF - OR THAT YOU ARE A FAILURE OR HAVE LET YOURSELF OR YOUR FAMILY DOWN: 0
SUM OF ALL RESPONSES TO PHQ QUESTIONS 1-9: 3
8. MOVING OR SPEAKING SO SLOWLY THAT OTHER PEOPLE COULD HAVE NOTICED. OR THE OPPOSITE, BEING SO FIGETY OR RESTLESS THAT YOU HAVE BEEN MOVING AROUND A LOT MORE THAN USUAL: 0
5. POOR APPETITE OR OVEREATING: 0
10. IF YOU CHECKED OFF ANY PROBLEMS, HOW DIFFICULT HAVE THESE PROBLEMS MADE IT FOR YOU TO DO YOUR WORK, TAKE CARE OF THINGS AT HOME, OR GET ALONG WITH OTHER PEOPLE: 2
SUM OF ALL RESPONSES TO PHQ QUESTIONS 1-9: 3
3. TROUBLE FALLING OR STAYING ASLEEP: 0
SUM OF ALL RESPONSES TO PHQ QUESTIONS 1-9: 3
SUM OF ALL RESPONSES TO PHQ QUESTIONS 1-9: 3
SUM OF ALL RESPONSES TO PHQ9 QUESTIONS 1 & 2: 0
4. FEELING TIRED OR HAVING LITTLE ENERGY: 3

## 2023-05-17 ASSESSMENT — ENCOUNTER SYMPTOMS
SHORTNESS OF BREATH: 0
COUGH: 0

## 2023-06-29 ENCOUNTER — OFFICE VISIT (OUTPATIENT)
Age: 43
End: 2023-06-29

## 2023-06-29 VITALS — DIASTOLIC BLOOD PRESSURE: 83 MMHG | SYSTOLIC BLOOD PRESSURE: 149 MMHG | HEART RATE: 72 BPM

## 2023-06-29 DIAGNOSIS — I10 ESSENTIAL (PRIMARY) HYPERTENSION: Primary | ICD-10-CM

## 2023-06-29 DIAGNOSIS — E78.00 PURE HYPERCHOLESTEROLEMIA, UNSPECIFIED: ICD-10-CM

## 2023-06-29 DIAGNOSIS — R73.03 PREDIABETES: ICD-10-CM

## 2023-06-29 RX ORDER — METOPROLOL SUCCINATE 100 MG/1
150 TABLET, EXTENDED RELEASE ORAL DAILY
Qty: 135 TABLET | Refills: 3 | Status: SHIPPED | OUTPATIENT
Start: 2023-06-29

## 2023-07-13 NOTE — TELEPHONE ENCOUNTER
Last Visit: 06- OV   Next Appointment: 11-  Previous Refill Encounter: aspirin on 10- #90 tabs with 1 refills  Plavix on 10- # 90 tabs with 1 refill      Requested Prescriptions     Pending Prescriptions Disp Refills    aspirin 81 MG chewable tablet 30 tablet      Sig: Take 1 tablet by mouth daily    clopidogrel (PLAVIX) 75 MG tablet 30 tablet      Sig: Take 1 tablet by mouth daily

## 2023-07-13 NOTE — TELEPHONE ENCOUNTER
Patient requesting medication refill    Last office visit 06/29/23  Next in office visit  11/01/23    aspirin 81 MG chewable tablet  clopidogrel (PLAVIX) 75 MG tablet    [facility] [id number] - [address], [address] [place] [address]   Phone:  [phone]  Fax:  [phone]

## 2023-07-17 ENCOUNTER — NURSE ONLY (OUTPATIENT)
Age: 43
End: 2023-07-17

## 2023-07-17 VITALS — SYSTOLIC BLOOD PRESSURE: 142 MMHG | DIASTOLIC BLOOD PRESSURE: 96 MMHG | HEART RATE: 75 BPM

## 2023-07-17 DIAGNOSIS — Z01.30 BLOOD PRESSURE CHECK: Primary | ICD-10-CM

## 2023-07-17 NOTE — PROGRESS NOTES
Patient came into the office today for a nurse blood pressure check appointment. After sitting calmly and quietly in the room for 10 minutes, patient's blood pressure was taken. The blood pressure was taken on the left upper arm, in the sitting position, and with the large blood pressure cuff. The blood pressure reading was 143/93 with a heart rate of 74. After an additional 10 minutes, the second blood pressure was taken. The blood pressure was taken on the left upper arm, in the sitting position, and with the large blood pressure cuff. The blood pressure reading was 142/96 with a heart rate of 75. Patient stated he has taken his blood pressure medication, metoprolol and hydrochlorothiazide, this morning as prescribed by NP Christian Richardson. Patient was advise to follow up with PCP and to continue taking medications as prescribe until told otherwise. Patient also had concerns regarding his blood thinner medication. He states he hasn't received a refill and would like to know if he can get his medication refill. I let the patient know his provider is out of office for the day but I will let her know his concerns. Patient verbalized understanding. No further questions or concerns.

## 2023-07-26 RX ORDER — ASPIRIN 81 MG/1
81 TABLET, CHEWABLE ORAL DAILY
Qty: 90 TABLET | Refills: 3 | Status: SHIPPED | OUTPATIENT
Start: 2023-07-26

## 2023-07-26 RX ORDER — CLOPIDOGREL BISULFATE 75 MG/1
75 TABLET ORAL DAILY
Qty: 90 TABLET | Refills: 3 | Status: SHIPPED | OUTPATIENT
Start: 2023-07-26

## 2023-11-01 ENCOUNTER — OFFICE VISIT (OUTPATIENT)
Age: 43
End: 2023-11-01
Payer: COMMERCIAL

## 2023-11-01 ENCOUNTER — HOSPITAL ENCOUNTER (OUTPATIENT)
Facility: HOSPITAL | Age: 43
Discharge: HOME | End: 2023-11-04
Payer: COMMERCIAL

## 2023-11-01 VITALS
RESPIRATION RATE: 16 BRPM | WEIGHT: 238.2 LBS | HEART RATE: 75 BPM | SYSTOLIC BLOOD PRESSURE: 146 MMHG | DIASTOLIC BLOOD PRESSURE: 82 MMHG | OXYGEN SATURATION: 97 % | HEIGHT: 72 IN | BODY MASS INDEX: 32.26 KG/M2 | TEMPERATURE: 99 F

## 2023-11-01 DIAGNOSIS — M10.9 GOUT, UNSPECIFIED CAUSE, UNSPECIFIED CHRONICITY, UNSPECIFIED SITE: ICD-10-CM

## 2023-11-01 DIAGNOSIS — R73.03 PREDIABETES: ICD-10-CM

## 2023-11-01 DIAGNOSIS — E78.00 PURE HYPERCHOLESTEROLEMIA, UNSPECIFIED: ICD-10-CM

## 2023-11-01 DIAGNOSIS — I10 ESSENTIAL (PRIMARY) HYPERTENSION: ICD-10-CM

## 2023-11-01 DIAGNOSIS — I10 ESSENTIAL (PRIMARY) HYPERTENSION: Primary | ICD-10-CM

## 2023-11-01 LAB
ALBUMIN SERPL-MCNC: 3.7 G/DL (ref 3.4–5)
ALBUMIN/GLOB SERPL: 1.1 (ref 0.8–1.7)
ALP SERPL-CCNC: 69 U/L (ref 45–117)
ALT SERPL-CCNC: 58 U/L (ref 16–61)
ANION GAP SERPL CALC-SCNC: 5 MMOL/L (ref 3–18)
AST SERPL-CCNC: 47 U/L (ref 10–38)
BILIRUB SERPL-MCNC: 0.4 MG/DL (ref 0.2–1)
BUN SERPL-MCNC: 15 MG/DL (ref 7–18)
BUN/CREAT SERPL: 18 (ref 12–20)
CALCIUM SERPL-MCNC: 9.2 MG/DL (ref 8.5–10.1)
CHLORIDE SERPL-SCNC: 106 MMOL/L (ref 100–111)
CHOLEST SERPL-MCNC: 179 MG/DL
CO2 SERPL-SCNC: 27 MMOL/L (ref 21–32)
CREAT SERPL-MCNC: 0.85 MG/DL (ref 0.6–1.3)
EST. AVERAGE GLUCOSE BLD GHB EST-MCNC: 111 MG/DL
GLOBULIN SER CALC-MCNC: 3.3 G/DL (ref 2–4)
GLUCOSE SERPL-MCNC: 91 MG/DL (ref 74–99)
HBA1C MFR BLD: 5.5 % (ref 4.2–5.6)
HDLC SERPL-MCNC: 41 MG/DL (ref 40–60)
HDLC SERPL: 4.4 (ref 0–5)
LDLC SERPL CALC-MCNC: 115 MG/DL (ref 0–100)
LIPID PANEL: ABNORMAL
POTASSIUM SERPL-SCNC: 3.8 MMOL/L (ref 3.5–5.5)
PROT SERPL-MCNC: 7 G/DL (ref 6.4–8.2)
SODIUM SERPL-SCNC: 138 MMOL/L (ref 136–145)
TRIGL SERPL-MCNC: 115 MG/DL
URATE SERPL-MCNC: 5.6 MG/DL (ref 2.6–7.2)
VLDLC SERPL CALC-MCNC: 23 MG/DL

## 2023-11-01 PROCEDURE — 3079F DIAST BP 80-89 MM HG: CPT | Performed by: NURSE PRACTITIONER

## 2023-11-01 PROCEDURE — 36415 COLL VENOUS BLD VENIPUNCTURE: CPT

## 2023-11-01 PROCEDURE — 80053 COMPREHEN METABOLIC PANEL: CPT

## 2023-11-01 PROCEDURE — 99214 OFFICE O/P EST MOD 30 MIN: CPT | Performed by: NURSE PRACTITIONER

## 2023-11-01 PROCEDURE — 3077F SYST BP >= 140 MM HG: CPT | Performed by: NURSE PRACTITIONER

## 2023-11-01 PROCEDURE — 83036 HEMOGLOBIN GLYCOSYLATED A1C: CPT

## 2023-11-01 PROCEDURE — 84550 ASSAY OF BLOOD/URIC ACID: CPT

## 2023-11-01 PROCEDURE — 80061 LIPID PANEL: CPT

## 2023-11-01 ASSESSMENT — ENCOUNTER SYMPTOMS
SHORTNESS OF BREATH: 0
COUGH: 0

## 2023-11-01 NOTE — PATIENT INSTRUCTIONS
Resume checking blood pressure daily and send picture of readings to [facility] in 2 weeks.

## 2023-11-25 ENCOUNTER — PATIENT MESSAGE (OUTPATIENT)
Age: 43
End: 2023-11-25

## 2023-12-12 RX ORDER — VALSARTAN AND HYDROCHLOROTHIAZIDE 160; 25 MG/1; MG/1
1 TABLET ORAL DAILY
Qty: 30 TABLET | Refills: 3 | Status: SHIPPED | OUTPATIENT
Start: 2023-12-12

## 2023-12-12 NOTE — TELEPHONE ENCOUNTER
From: Ashley Arguello  To: Michelle Up  Sent: 11/25/2023 3:41 PM EST  Subject: Blood pressure numbers    Numbers attached

## 2024-03-08 ENCOUNTER — HOSPITAL ENCOUNTER (OUTPATIENT)
Facility: HOSPITAL | Age: 44
Discharge: HOME | End: 2024-03-08
Payer: COMMERCIAL

## 2024-03-08 DIAGNOSIS — D72.829 LEUKOCYTOSIS, UNSPECIFIED TYPE: ICD-10-CM

## 2024-03-08 DIAGNOSIS — D68.59 PRIMARY HYPERCOAGULABLE STATE (HCC): ICD-10-CM

## 2024-03-08 PROCEDURE — 70220 X-RAY EXAM OF SINUSES: CPT

## 2024-03-08 PROCEDURE — 71046 X-RAY EXAM CHEST 2 VIEWS: CPT

## 2024-03-09 ENCOUNTER — HOSPITAL ENCOUNTER (OUTPATIENT)
Facility: HOSPITAL | Age: 44
End: 2024-03-09
Attending: INTERNAL MEDICINE
Payer: COMMERCIAL

## 2024-03-09 DIAGNOSIS — D72.829 LEUKOCYTOSIS, UNSPECIFIED TYPE: ICD-10-CM

## 2024-03-09 PROCEDURE — 76700 US EXAM ABDOM COMPLETE: CPT

## 2024-04-01 ENCOUNTER — HOSPITAL ENCOUNTER (OUTPATIENT)
Facility: HOSPITAL | Age: 44
Setting detail: SPECIMEN
Discharge: HOME | End: 2024-04-04
Payer: COMMERCIAL

## 2024-04-01 DIAGNOSIS — R73.03 PREDIABETES: ICD-10-CM

## 2024-04-01 DIAGNOSIS — M10.9 GOUT, UNSPECIFIED CAUSE, UNSPECIFIED CHRONICITY, UNSPECIFIED SITE: ICD-10-CM

## 2024-04-01 DIAGNOSIS — E78.00 PURE HYPERCHOLESTEROLEMIA, UNSPECIFIED: ICD-10-CM

## 2024-04-01 DIAGNOSIS — I10 ESSENTIAL (PRIMARY) HYPERTENSION: ICD-10-CM

## 2024-04-01 LAB
ALBUMIN SERPL-MCNC: 4 G/DL (ref 3.4–5)
ALBUMIN/GLOB SERPL: 1 (ref 0.8–1.7)
ALP SERPL-CCNC: 75 U/L (ref 45–117)
ALT SERPL-CCNC: 97 U/L (ref 16–61)
ANION GAP SERPL CALC-SCNC: 6 MMOL/L (ref 3–18)
AST SERPL-CCNC: 57 U/L (ref 10–38)
BILIRUB SERPL-MCNC: 0.5 MG/DL (ref 0.2–1)
BUN SERPL-MCNC: 13 MG/DL (ref 7–18)
BUN/CREAT SERPL: 11 (ref 12–20)
CALCIUM SERPL-MCNC: 9.9 MG/DL (ref 8.5–10.1)
CHLORIDE SERPL-SCNC: 102 MMOL/L (ref 100–111)
CHOLEST SERPL-MCNC: 142 MG/DL
CO2 SERPL-SCNC: 31 MMOL/L (ref 21–32)
CREAT SERPL-MCNC: 1.2 MG/DL (ref 0.6–1.3)
EST. AVERAGE GLUCOSE BLD GHB EST-MCNC: 103 MG/DL
GLOBULIN SER CALC-MCNC: 4 G/DL (ref 2–4)
GLUCOSE SERPL-MCNC: 108 MG/DL (ref 74–99)
HBA1C MFR BLD: 5.2 % (ref 4.2–5.6)
HDLC SERPL-MCNC: 46 MG/DL (ref 40–60)
HDLC SERPL: 3.1 (ref 0–5)
LDLC SERPL CALC-MCNC: 77.4 MG/DL (ref 0–100)
LIPID PANEL: NORMAL
POTASSIUM SERPL-SCNC: 4.1 MMOL/L (ref 3.5–5.5)
PROT SERPL-MCNC: 8 G/DL (ref 6.4–8.2)
SODIUM SERPL-SCNC: 139 MMOL/L (ref 136–145)
TRIGL SERPL-MCNC: 93 MG/DL
URATE SERPL-MCNC: 6.5 MG/DL (ref 2.6–7.2)
VLDLC SERPL CALC-MCNC: 18.6 MG/DL

## 2024-04-01 PROCEDURE — 83036 HEMOGLOBIN GLYCOSYLATED A1C: CPT

## 2024-04-01 PROCEDURE — 80061 LIPID PANEL: CPT

## 2024-04-01 PROCEDURE — 36415 COLL VENOUS BLD VENIPUNCTURE: CPT

## 2024-04-01 PROCEDURE — 84550 ASSAY OF BLOOD/URIC ACID: CPT

## 2024-04-01 PROCEDURE — 80053 COMPREHEN METABOLIC PANEL: CPT

## 2024-04-09 ENCOUNTER — HOSPITAL ENCOUNTER (OUTPATIENT)
Facility: HOSPITAL | Age: 44
Discharge: HOME | End: 2024-04-12
Attending: INTERNAL MEDICINE
Payer: COMMERCIAL

## 2024-04-09 ENCOUNTER — TRANSCRIBE ORDERS (OUTPATIENT)
Facility: HOSPITAL | Age: 44
End: 2024-04-09

## 2024-04-09 DIAGNOSIS — R91.1 SOLITARY PULMONARY NODULE: Primary | ICD-10-CM

## 2024-04-09 DIAGNOSIS — R91.1 SOLITARY PULMONARY NODULE: ICD-10-CM

## 2024-04-09 PROCEDURE — 71250 CT THORAX DX C-: CPT

## 2024-04-09 RX ORDER — VALSARTAN AND HYDROCHLOROTHIAZIDE 160; 25 MG/1; MG/1
1 TABLET ORAL DAILY
Qty: 30 TABLET | Refills: 3 | OUTPATIENT
Start: 2024-04-09

## 2024-04-09 RX ORDER — ATORVASTATIN CALCIUM 40 MG/1
40 TABLET, FILM COATED ORAL NIGHTLY
Qty: 90 TABLET | Refills: 1 | OUTPATIENT
Start: 2024-04-09

## 2024-05-03 RX ORDER — ALLOPURINOL 100 MG/1
100 TABLET ORAL DAILY
Qty: 90 TABLET | Refills: 3 | Status: SHIPPED | OUTPATIENT
Start: 2024-05-03

## 2024-06-19 ENCOUNTER — HOSPITAL ENCOUNTER (OUTPATIENT)
Facility: HOSPITAL | Age: 44
Discharge: HOME | End: 2024-06-22
Payer: COMMERCIAL

## 2024-06-19 LAB
ALBUMIN SERPL-MCNC: 3.8 G/DL (ref 3.4–5)
ALBUMIN/GLOB SERPL: 1.1 (ref 0.8–1.7)
ALP SERPL-CCNC: 86 U/L (ref 45–117)
ALT SERPL-CCNC: 45 U/L (ref 16–61)
ANION GAP SERPL CALC-SCNC: 4 MMOL/L (ref 3–18)
AST SERPL-CCNC: 28 U/L (ref 10–38)
BILIRUB SERPL-MCNC: 0.6 MG/DL (ref 0.2–1)
BUN SERPL-MCNC: 10 MG/DL (ref 7–18)
BUN/CREAT SERPL: 11 (ref 12–20)
CALCIUM SERPL-MCNC: 9.8 MG/DL (ref 8.5–10.1)
CHLORIDE SERPL-SCNC: 110 MMOL/L (ref 100–111)
CO2 SERPL-SCNC: 29 MMOL/L (ref 21–32)
CREAT SERPL-MCNC: 0.91 MG/DL (ref 0.6–1.3)
ERYTHROCYTE [DISTWIDTH] IN BLOOD BY AUTOMATED COUNT: 14.1 % (ref 11.6–14.5)
GLOBULIN SER CALC-MCNC: 3.6 G/DL (ref 2–4)
GLUCOSE SERPL-MCNC: 116 MG/DL (ref 74–99)
HCT VFR BLD AUTO: 45.2 % (ref 36–48)
HGB BLD-MCNC: 14.9 G/DL (ref 13–16)
MCH RBC QN AUTO: 31.4 PG (ref 24–34)
MCHC RBC AUTO-ENTMCNC: 33 G/DL (ref 31–37)
MCV RBC AUTO: 95.4 FL (ref 78–100)
NRBC # BLD: 0 K/UL (ref 0–0.01)
NRBC BLD-RTO: 0 PER 100 WBC
PLATELET # BLD AUTO: 338 K/UL (ref 135–420)
PMV BLD AUTO: 10.1 FL (ref 9.2–11.8)
POTASSIUM SERPL-SCNC: 4.1 MMOL/L (ref 3.5–5.5)
PROT SERPL-MCNC: 7.4 G/DL (ref 6.4–8.2)
RBC # BLD AUTO: 4.74 M/UL (ref 4.35–5.65)
SODIUM SERPL-SCNC: 143 MMOL/L (ref 136–145)
WBC # BLD AUTO: 11.5 K/UL (ref 4.6–13.2)

## 2024-06-19 PROCEDURE — 85027 COMPLETE CBC AUTOMATED: CPT

## 2024-06-19 PROCEDURE — 80053 COMPREHEN METABOLIC PANEL: CPT

## 2024-06-19 PROCEDURE — 36415 COLL VENOUS BLD VENIPUNCTURE: CPT

## 2024-07-11 RX ORDER — METOPROLOL SUCCINATE 100 MG/1
TABLET, EXTENDED RELEASE ORAL
Qty: 135 TABLET | Refills: 3 | Status: SHIPPED | OUTPATIENT
Start: 2024-07-11

## 2024-07-17 ENCOUNTER — CLINICAL DOCUMENTATION (OUTPATIENT)
Facility: CLINIC | Age: 44
End: 2024-07-17

## 2024-07-23 RX ORDER — ASPIRIN 81 MG/1
TABLET, CHEWABLE ORAL
Qty: 90 TABLET | Refills: 3 | Status: SHIPPED | OUTPATIENT
Start: 2024-07-23

## 2024-07-23 RX ORDER — CLOPIDOGREL BISULFATE 75 MG/1
75 TABLET ORAL DAILY
Qty: 90 TABLET | Refills: 3 | Status: SHIPPED | OUTPATIENT
Start: 2024-07-23

## 2024-07-24 ENCOUNTER — TELEPHONE (OUTPATIENT)
Facility: CLINIC | Age: 44
End: 2024-07-24

## 2024-07-24 DIAGNOSIS — I10 ESSENTIAL (PRIMARY) HYPERTENSION: Primary | ICD-10-CM

## 2024-07-25 ENCOUNTER — TELEPHONE (OUTPATIENT)
Dept: PHARMACY | Facility: CLINIC | Age: 44
End: 2024-07-25

## 2024-07-25 NOTE — TELEPHONE ENCOUNTER
Reason for referral: Hypertension  Referring provider: Florence Frost  Referring provider office: [facility]  Referred to: Armin Coello, PharmD, BCACP, BC-ADM  Status of Patient: New Patient  Length of Appt: 60 minutes  Type of Appt: In Person   Patient need address: No

## 2024-07-25 NOTE — TELEPHONE ENCOUNTER
**[facility]**    Attempt made to contact patient at the mobile number.    Left a message requesting a call back at [phone] to schedule the appointment        Louise Sethi [facility]   Ambulatory Pharmacy Clinical   [phone]  Department, toll free: [phone], option 2

## 2024-07-26 NOTE — TELEPHONE ENCOUNTER
**[facility] is to [facility] with [facility]**    Second Attempt made to contact patient at the mobile number.    Spoke to patient at [phone] number and advised them of the previous message.    New Patient verified understanding and scheduled a in person appointment .  Hypertension Appointment scheduled for 9/12/24 at 2:30 pm with Armin Coello. *Will call Baron Snyder [phone] to have med cost documentation faxed to # [phone]*    Louise Sethi [facility]   Ambulatory Pharmacy Clinical   [phone]  Department, toll free: [phone], option 2       For Pharmacy Admin Tracking Only    Program: Medical Group  Recommendation Provided To: Patient/Caregiver: 2 via Telephone  Intervention Detail: Scheduled Appointment  Intervention Accepted By: Patient/Caregiver: 2  Gap Closed?: Yes   Time Spent (min): 10

## 2024-09-12 ENCOUNTER — PHARMACY VISIT (OUTPATIENT)
Facility: CLINIC | Age: 44
End: 2024-09-12

## 2024-09-12 VITALS — HEART RATE: 82 BPM | DIASTOLIC BLOOD PRESSURE: 75 MMHG | SYSTOLIC BLOOD PRESSURE: 115 MMHG

## 2024-09-12 DIAGNOSIS — I10 ESSENTIAL (PRIMARY) HYPERTENSION: Primary | ICD-10-CM

## 2024-09-12 RX ORDER — MESALAMINE 1.2 G/1
4800 TABLET, DELAYED RELEASE ORAL
COMMUNITY
Start: 2024-07-22

## 2024-09-30 ENCOUNTER — HOSPITAL ENCOUNTER (OUTPATIENT)
Facility: HOSPITAL | Age: 44
Setting detail: SPECIMEN
Discharge: HOME | End: 2024-10-03
Payer: COMMERCIAL

## 2024-09-30 DIAGNOSIS — M10.9 GOUT, UNSPECIFIED CAUSE, UNSPECIFIED CHRONICITY, UNSPECIFIED SITE: ICD-10-CM

## 2024-09-30 DIAGNOSIS — R73.03 PREDIABETES: ICD-10-CM

## 2024-09-30 DIAGNOSIS — I10 ESSENTIAL (PRIMARY) HYPERTENSION: ICD-10-CM

## 2024-09-30 DIAGNOSIS — E78.00 PURE HYPERCHOLESTEROLEMIA, UNSPECIFIED: ICD-10-CM

## 2024-09-30 DIAGNOSIS — I10 ESSENTIAL (PRIMARY) HYPERTENSION: Primary | ICD-10-CM

## 2024-09-30 LAB
ALBUMIN SERPL-MCNC: 3.9 G/DL (ref 3.4–5)
ALBUMIN/GLOB SERPL: 1.1 (ref 0.8–1.7)
ALP SERPL-CCNC: 98 U/L (ref 45–117)
ALT SERPL-CCNC: 31 U/L (ref 16–61)
ANION GAP SERPL CALC-SCNC: 6 MMOL/L (ref 3–18)
AST SERPL-CCNC: 20 U/L (ref 10–38)
BASOPHILS # BLD: 0.1 K/UL (ref 0–0.1)
BASOPHILS NFR BLD: 2 % (ref 0–2)
BILIRUB SERPL-MCNC: 0.5 MG/DL (ref 0.2–1)
BUN SERPL-MCNC: 13 MG/DL (ref 7–18)
BUN/CREAT SERPL: 16 (ref 12–20)
CALCIUM SERPL-MCNC: 9.2 MG/DL (ref 8.5–10.1)
CHLORIDE SERPL-SCNC: 111 MMOL/L (ref 100–111)
CHOLEST SERPL-MCNC: 133 MG/DL
CO2 SERPL-SCNC: 28 MMOL/L (ref 21–32)
CREAT SERPL-MCNC: 0.81 MG/DL (ref 0.6–1.3)
DIFFERENTIAL METHOD BLD: NORMAL
EOSINOPHIL # BLD: 0.3 K/UL (ref 0–0.4)
EOSINOPHIL NFR BLD: 4 % (ref 0–5)
ERYTHROCYTE [DISTWIDTH] IN BLOOD BY AUTOMATED COUNT: 12.7 % (ref 11.6–14.5)
EST. AVERAGE GLUCOSE BLD GHB EST-MCNC: 105 MG/DL
GLOBULIN SER CALC-MCNC: 3.4 G/DL (ref 2–4)
GLUCOSE SERPL-MCNC: 84 MG/DL (ref 74–99)
HBA1C MFR BLD: 5.3 % (ref 4.2–5.6)
HCT VFR BLD AUTO: 46.1 % (ref 36–48)
HDLC SERPL-MCNC: 53 MG/DL (ref 40–60)
HDLC SERPL: 2.5 (ref 0–5)
HGB BLD-MCNC: 14.9 G/DL (ref 13–16)
IMM GRANULOCYTES # BLD AUTO: 0 K/UL (ref 0–0.04)
IMM GRANULOCYTES NFR BLD AUTO: 0 % (ref 0–0.5)
LDLC SERPL CALC-MCNC: 43 MG/DL (ref 0–100)
LIPID PANEL: ABNORMAL
LYMPHOCYTES # BLD: 1.9 K/UL (ref 0.9–3.6)
LYMPHOCYTES NFR BLD: 24 % (ref 21–52)
MCH RBC QN AUTO: 30.9 PG (ref 24–34)
MCHC RBC AUTO-ENTMCNC: 32.3 G/DL (ref 31–37)
MCV RBC AUTO: 95.6 FL (ref 78–100)
MONOCYTES # BLD: 0.4 K/UL (ref 0.05–1.2)
MONOCYTES NFR BLD: 5 % (ref 3–10)
NEUTS SEG # BLD: 4.9 K/UL (ref 1.8–8)
NEUTS SEG NFR BLD: 65 % (ref 40–73)
NRBC # BLD: 0 K/UL (ref 0–0.01)
NRBC BLD-RTO: 0 PER 100 WBC
PLATELET # BLD AUTO: 362 K/UL (ref 135–420)
PMV BLD AUTO: 9.3 FL (ref 9.2–11.8)
POTASSIUM SERPL-SCNC: 4.2 MMOL/L (ref 3.5–5.5)
PROT SERPL-MCNC: 7.3 G/DL (ref 6.4–8.2)
RBC # BLD AUTO: 4.82 M/UL (ref 4.35–5.65)
SODIUM SERPL-SCNC: 145 MMOL/L (ref 136–145)
TRIGL SERPL-MCNC: 185 MG/DL
URATE SERPL-MCNC: 5.3 MG/DL (ref 2.6–7.2)
VLDLC SERPL CALC-MCNC: 37 MG/DL
WBC # BLD AUTO: 7.6 K/UL (ref 4.6–13.2)

## 2024-09-30 PROCEDURE — 80053 COMPREHEN METABOLIC PANEL: CPT

## 2024-09-30 PROCEDURE — 80061 LIPID PANEL: CPT

## 2024-09-30 PROCEDURE — 83036 HEMOGLOBIN GLYCOSYLATED A1C: CPT

## 2024-09-30 PROCEDURE — 85025 COMPLETE CBC W/AUTO DIFF WBC: CPT

## 2024-09-30 PROCEDURE — 84550 ASSAY OF BLOOD/URIC ACID: CPT

## 2024-09-30 PROCEDURE — 36415 COLL VENOUS BLD VENIPUNCTURE: CPT

## 2025-03-13 PROBLEM — Z86.73 HISTORY OF STROKE: Status: ACTIVE | Noted: 2025-03-13

## 2025-03-13 PROBLEM — I10 ESSENTIAL HYPERTENSION: Status: ACTIVE | Noted: 2025-03-13

## 2025-05-01 RX ORDER — ALLOPURINOL 100 MG/1
100 TABLET ORAL DAILY
Qty: 90 TABLET | Refills: 3 | OUTPATIENT
Start: 2025-05-01

## 2025-05-19 ASSESSMENT — ENCOUNTER SYMPTOMS
CHEST TIGHTNESS: 0
VOMITING: 0
WHEEZING: 0
SORE THROAT: 0
NAUSEA: 0
BACK PAIN: 0
DIARRHEA: 0
SHORTNESS OF BREATH: 0
EYE PAIN: 0
RHINORRHEA: 0
BLOOD IN STOOL: 0
CONSTIPATION: 0
COUGH: 0
ABDOMINAL PAIN: 0

## 2025-05-20 SDOH — HEALTH STABILITY: PHYSICAL HEALTH: ON AVERAGE, HOW MANY DAYS PER WEEK DO YOU ENGAGE IN MODERATE TO STRENUOUS EXERCISE (LIKE A BRISK WALK)?: 5 DAYS

## 2025-05-20 SDOH — HEALTH STABILITY: PHYSICAL HEALTH: ON AVERAGE, HOW MANY MINUTES DO YOU ENGAGE IN EXERCISE AT THIS LEVEL?: 30 MIN

## 2025-05-21 ENCOUNTER — OFFICE VISIT (OUTPATIENT)
Facility: CLINIC | Age: 45
End: 2025-05-21
Payer: COMMERCIAL

## 2025-05-21 VITALS
SYSTOLIC BLOOD PRESSURE: 152 MMHG | HEIGHT: 72 IN | HEART RATE: 84 BPM | TEMPERATURE: 98.2 F | OXYGEN SATURATION: 95 % | DIASTOLIC BLOOD PRESSURE: 105 MMHG | RESPIRATION RATE: 18 BRPM | WEIGHT: 247 LBS | BODY MASS INDEX: 33.46 KG/M2

## 2025-05-21 DIAGNOSIS — Z13.29 SCREENING FOR ENDOCRINE DISORDER: ICD-10-CM

## 2025-05-21 DIAGNOSIS — I10 UNCONTROLLED HYPERTENSION: Primary | ICD-10-CM

## 2025-05-21 DIAGNOSIS — E88.01 ALPHA-1-ANTITRYPSIN DEFICIENCY (HCC): ICD-10-CM

## 2025-05-21 DIAGNOSIS — M10.9 GOUT, UNSPECIFIED CAUSE, UNSPECIFIED CHRONICITY, UNSPECIFIED SITE: ICD-10-CM

## 2025-05-21 DIAGNOSIS — J44.9 CHRONIC OBSTRUCTIVE PULMONARY DISEASE, UNSPECIFIED COPD TYPE (HCC): ICD-10-CM

## 2025-05-21 DIAGNOSIS — Z11.4 SCREENING FOR HIV WITHOUT PRESENCE OF RISK FACTORS: ICD-10-CM

## 2025-05-21 DIAGNOSIS — K51.911 ULCERATIVE COLITIS WITH RECTAL BLEEDING, UNSPECIFIED LOCATION (HCC): ICD-10-CM

## 2025-05-21 DIAGNOSIS — G47.33 OSA (OBSTRUCTIVE SLEEP APNEA): ICD-10-CM

## 2025-05-21 DIAGNOSIS — D75.9: ICD-10-CM

## 2025-05-21 DIAGNOSIS — Z76.89 ENCOUNTER TO ESTABLISH CARE WITH NEW DOCTOR: ICD-10-CM

## 2025-05-21 DIAGNOSIS — Z87.898 HISTORY OF CHEST PAIN: ICD-10-CM

## 2025-05-21 DIAGNOSIS — Z13.6 SCREENING FOR CARDIOVASCULAR CONDITION: ICD-10-CM

## 2025-05-21 DIAGNOSIS — E78.00 PURE HYPERCHOLESTEROLEMIA, UNSPECIFIED: ICD-10-CM

## 2025-05-21 DIAGNOSIS — F41.1 GENERALIZED ANXIETY DISORDER: ICD-10-CM

## 2025-05-21 PROCEDURE — 3078F DIAST BP <80 MM HG: CPT | Performed by: STUDENT IN AN ORGANIZED HEALTH CARE EDUCATION/TRAINING PROGRAM

## 2025-05-21 PROCEDURE — 3077F SYST BP >= 140 MM HG: CPT | Performed by: STUDENT IN AN ORGANIZED HEALTH CARE EDUCATION/TRAINING PROGRAM

## 2025-05-21 PROCEDURE — G2211 COMPLEX E/M VISIT ADD ON: HCPCS | Performed by: STUDENT IN AN ORGANIZED HEALTH CARE EDUCATION/TRAINING PROGRAM

## 2025-05-21 PROCEDURE — 99204 OFFICE O/P NEW MOD 45 MIN: CPT | Performed by: STUDENT IN AN ORGANIZED HEALTH CARE EDUCATION/TRAINING PROGRAM

## 2025-05-21 RX ORDER — ATORVASTATIN CALCIUM 40 MG/1
40 TABLET, FILM COATED ORAL NIGHTLY
Qty: 90 TABLET | Refills: 1 | Status: SHIPPED | OUTPATIENT
Start: 2025-05-21

## 2025-05-21 RX ORDER — KETOCONAZOLE 20 MG/ML
SHAMPOO, SUSPENSION TOPICAL
COMMUNITY
Start: 2025-05-16

## 2025-05-21 RX ORDER — ALLOPURINOL 100 MG/1
100 TABLET ORAL DAILY
Qty: 90 TABLET | Refills: 1 | Status: SHIPPED | OUTPATIENT
Start: 2025-05-21

## 2025-05-21 RX ORDER — ASPIRIN 81 MG/1
81 TABLET, CHEWABLE ORAL DAILY
Qty: 90 TABLET | Refills: 3 | Status: SHIPPED | OUTPATIENT
Start: 2025-05-21

## 2025-05-21 RX ORDER — VALSARTAN AND HYDROCHLOROTHIAZIDE 160; 25 MG/1; MG/1
1 TABLET ORAL DAILY
Qty: 90 TABLET | Refills: 1 | Status: SHIPPED | OUTPATIENT
Start: 2025-05-21

## 2025-05-21 RX ORDER — CLOBETASOL PROPIONATE 0.5 MG/ML
SOLUTION TOPICAL
COMMUNITY
Start: 2025-05-16

## 2025-05-21 SDOH — ECONOMIC STABILITY: FOOD INSECURITY: WITHIN THE PAST 12 MONTHS, YOU WORRIED THAT YOUR FOOD WOULD RUN OUT BEFORE YOU GOT MONEY TO BUY MORE.: NEVER TRUE

## 2025-05-21 SDOH — ECONOMIC STABILITY: FOOD INSECURITY: WITHIN THE PAST 12 MONTHS, THE FOOD YOU BOUGHT JUST DIDN'T LAST AND YOU DIDN'T HAVE MONEY TO GET MORE.: NEVER TRUE

## 2025-05-21 ASSESSMENT — PATIENT HEALTH QUESTIONNAIRE - PHQ9
3. TROUBLE FALLING OR STAYING ASLEEP: NOT AT ALL
10. IF YOU CHECKED OFF ANY PROBLEMS, HOW DIFFICULT HAVE THESE PROBLEMS MADE IT FOR YOU TO DO YOUR WORK, TAKE CARE OF THINGS AT HOME, OR GET ALONG WITH OTHER PEOPLE: NOT DIFFICULT AT ALL
9. THOUGHTS THAT YOU WOULD BE BETTER OFF DEAD, OR OF HURTING YOURSELF: NOT AT ALL
SUM OF ALL RESPONSES TO PHQ QUESTIONS 1-9: 0
6. FEELING BAD ABOUT YOURSELF - OR THAT YOU ARE A FAILURE OR HAVE LET YOURSELF OR YOUR FAMILY DOWN: NOT AT ALL
5. POOR APPETITE OR OVEREATING: NOT AT ALL
SUM OF ALL RESPONSES TO PHQ QUESTIONS 1-9: 0
2. FEELING DOWN, DEPRESSED OR HOPELESS: NOT AT ALL
1. LITTLE INTEREST OR PLEASURE IN DOING THINGS: NOT AT ALL
SUM OF ALL RESPONSES TO PHQ QUESTIONS 1-9: 0
4. FEELING TIRED OR HAVING LITTLE ENERGY: NOT AT ALL
7. TROUBLE CONCENTRATING ON THINGS, SUCH AS READING THE NEWSPAPER OR WATCHING TELEVISION: NOT AT ALL
SUM OF ALL RESPONSES TO PHQ QUESTIONS 1-9: 0
8. MOVING OR SPEAKING SO SLOWLY THAT OTHER PEOPLE COULD HAVE NOTICED. OR THE OPPOSITE, BEING SO FIGETY OR RESTLESS THAT YOU HAVE BEEN MOVING AROUND A LOT MORE THAN USUAL: NOT AT ALL

## 2025-05-21 NOTE — PROGRESS NOTES
Have you been to the ER, urgent care clinic since your last visit?  Hospitalized since your last visit?   YES - When: approximately 4 months ago.  Where and Why: Pt went too the urgent care for lightheadedness.    Have you seen or consulted any other health care providers outside our system since your last visit?   NO            
Sexual Activity    Alcohol use: Yes     Alcohol/week: 12.0 standard drinks of alcohol     Types: 12 Standard drinks or equivalent per week    Drug use: No    Sexual activity: Yes     Birth control/protection: None   Other Topics Concern    Not on file   Social History Narrative    Not on file     Social Drivers of Health     Financial Resource Strain: High Risk (8/25/2022)    Received from [facility]  (prior to 6/17/2023), [facility]  (prior to 6/17/2023)    Overall Financial Resource Strain (CARDIA)     Difficulty of Paying Living Expenses: Hard   Food Insecurity: No Food Insecurity (5/21/2025)    Hunger Vital Sign     Worried About Running Out of Food in the Last Year: Never true     Ran Out of Food in the Last Year: Never true   Transportation Needs: No Transportation Needs (5/21/2025)    PRAPARE - Transportation     Lack of Transportation (Medical): No     Lack of Transportation (Non-Medical): No   Physical Activity: Sufficiently Active (5/20/2025)    Exercise Vital Sign     Days of Exercise per Week: 5 days     Minutes of Exercise per Session: 30 min   Stress: Not on file   Social Connections: Not on file   Intimate Partner Violence: Not on file   Housing Stability: Low Risk  (5/21/2025)    Housing Stability Vital Sign     Unable to Pay for Housing in the Last Year: No     Number of Times Moved in the Last Year: 0     Homeless in the Last Year: No       Current Outpatient Medications   Medication Sig Dispense Refill    ketoconazole (NIZORAL) 2 % shampoo       clobetasol (TEMOVATE) 0.05 % external solution       valsartan-hydroCHLOROthiazide (DIOVAN-HCT) 160-25 MG per tablet Take 1 tablet by mouth daily 90 tablet 1    tirzepatide-weight management (ZEPBOUND) 2.5 MG/0.5ML SOAJ subCUTAneous auto-injector pen Inject 2.5 mg into the skin every 7 days 2 mL 0    allopurinol (ZYLOPRIM) 100 MG tablet Take 1 tablet by mouth daily 90 tablet 1    atorvastatin (LIPITOR) 40 MG tablet Take 1

## 2025-05-22 ENCOUNTER — HOSPITAL ENCOUNTER (OUTPATIENT)
Age: 45
Discharge: HOME | End: 2025-05-22
Payer: COMMERCIAL

## 2025-05-22 DIAGNOSIS — Z11.4 SCREENING FOR HIV WITHOUT PRESENCE OF RISK FACTORS: ICD-10-CM

## 2025-05-22 DIAGNOSIS — Z13.29 SCREENING FOR ENDOCRINE DISORDER: ICD-10-CM

## 2025-05-22 DIAGNOSIS — Z76.89 ENCOUNTER TO ESTABLISH CARE WITH NEW DOCTOR: ICD-10-CM

## 2025-05-22 DIAGNOSIS — Z13.6 SCREENING FOR CARDIOVASCULAR CONDITION: ICD-10-CM

## 2025-05-22 LAB
ALBUMIN SERPL-MCNC: 3.5 G/DL (ref 3.4–5)
ALBUMIN/GLOB SERPL: 1.2 (ref 0.8–1.7)
ALP SERPL-CCNC: 75 U/L (ref 45–117)
ALT SERPL-CCNC: 46 U/L (ref 10–50)
ANION GAP SERPL CALC-SCNC: 15 MMOL/L (ref 3–18)
AST SERPL-CCNC: 34 U/L (ref 10–38)
BILIRUB SERPL-MCNC: 0.2 MG/DL (ref 0.2–1)
BUN SERPL-MCNC: 23 MG/DL (ref 6–23)
BUN/CREAT SERPL: 22 (ref 12–20)
CALCIUM SERPL-MCNC: 9.1 MG/DL (ref 8.5–10.1)
CHLORIDE SERPL-SCNC: 104 MMOL/L (ref 98–107)
CHOLEST SERPL-MCNC: 170 MG/DL
CO2 SERPL-SCNC: 21 MMOL/L (ref 21–32)
CREAT SERPL-MCNC: 1.05 MG/DL (ref 0.6–1.3)
EST. AVERAGE GLUCOSE BLD GHB EST-MCNC: 120 MG/DL
GLOBULIN SER CALC-MCNC: 3 G/DL (ref 2–4)
GLUCOSE SERPL-MCNC: 196 MG/DL (ref 74–108)
HBA1C MFR BLD: 5.8 % (ref 4.2–5.6)
HDLC SERPL-MCNC: 49 MG/DL (ref 40–60)
HDLC SERPL: 3.4 (ref 0–5)
LDLC SERPL CALC-MCNC: 89 MG/DL (ref 0–100)
POTASSIUM SERPL-SCNC: 4 MMOL/L (ref 3.5–5.5)
PROT SERPL-MCNC: 6.5 G/DL (ref 6.4–8.2)
SODIUM SERPL-SCNC: 140 MMOL/L (ref 136–145)
TRIGL SERPL-MCNC: 161 MG/DL (ref 0–150)
VLDLC SERPL CALC-MCNC: 32 MG/DL

## 2025-05-22 PROCEDURE — 83036 HEMOGLOBIN GLYCOSYLATED A1C: CPT

## 2025-05-22 PROCEDURE — 36415 COLL VENOUS BLD VENIPUNCTURE: CPT

## 2025-05-22 PROCEDURE — 80061 LIPID PANEL: CPT

## 2025-05-22 PROCEDURE — 87389 HIV-1 AG W/HIV-1&-2 AB AG IA: CPT

## 2025-05-22 PROCEDURE — 80053 COMPREHEN METABOLIC PANEL: CPT

## 2025-05-23 ENCOUNTER — RESULTS FOLLOW-UP (OUTPATIENT)
Facility: CLINIC | Age: 45
End: 2025-05-23

## 2025-05-23 LAB
HIV 1+2 AB+HIV1 P24 AG SERPL QL IA: NONREACTIVE
HIV 1/2 RESULT COMMENT: NORMAL

## 2025-05-28 ENCOUNTER — CLINICAL SUPPORT (OUTPATIENT)
Facility: CLINIC | Age: 45
End: 2025-05-28

## 2025-05-28 VITALS — DIASTOLIC BLOOD PRESSURE: 91 MMHG | SYSTOLIC BLOOD PRESSURE: 133 MMHG

## 2025-05-28 DIAGNOSIS — I10 UNCONTROLLED HYPERTENSION: ICD-10-CM

## 2025-06-01 ENCOUNTER — PATIENT MESSAGE (OUTPATIENT)
Facility: CLINIC | Age: 45
End: 2025-06-01

## 2025-06-01 DIAGNOSIS — I10 UNCONTROLLED HYPERTENSION: ICD-10-CM

## 2025-06-01 DIAGNOSIS — E66.811 CLASS 1 OBESITY DUE TO EXCESS CALORIES WITH SERIOUS COMORBIDITY AND BODY MASS INDEX (BMI) OF 32.0 TO 32.9 IN ADULT: Primary | ICD-10-CM

## 2025-06-01 DIAGNOSIS — E66.09 CLASS 1 OBESITY DUE TO EXCESS CALORIES WITH SERIOUS COMORBIDITY AND BODY MASS INDEX (BMI) OF 32.0 TO 32.9 IN ADULT: Primary | ICD-10-CM

## 2025-06-02 DIAGNOSIS — E66.09 CLASS 1 OBESITY DUE TO EXCESS CALORIES WITH SERIOUS COMORBIDITY AND BODY MASS INDEX (BMI) OF 32.0 TO 32.9 IN ADULT: ICD-10-CM

## 2025-06-02 DIAGNOSIS — E66.811 CLASS 1 OBESITY DUE TO EXCESS CALORIES WITH SERIOUS COMORBIDITY AND BODY MASS INDEX (BMI) OF 32.0 TO 32.9 IN ADULT: ICD-10-CM

## 2025-06-02 RX ORDER — PHENTERMINE HYDROCHLORIDE 15 MG/1
15 CAPSULE ORAL EVERY MORNING
Qty: 90 CAPSULE | Refills: 0 | Status: SHIPPED | OUTPATIENT
Start: 2025-06-02 | End: 2025-08-31

## 2025-06-02 RX ORDER — PHENTERMINE HYDROCHLORIDE 15 MG/1
15 CAPSULE ORAL EVERY MORNING
Qty: 30 CAPSULE | Refills: 0 | Status: SHIPPED | OUTPATIENT
Start: 2025-06-02 | End: 2025-06-02

## 2025-06-27 ENCOUNTER — PATIENT MESSAGE (OUTPATIENT)
Facility: CLINIC | Age: 45
End: 2025-06-27

## 2025-06-27 DIAGNOSIS — G47.33 OSA (OBSTRUCTIVE SLEEP APNEA): Primary | ICD-10-CM

## 2025-07-07 PROBLEM — Z86.73 HISTORY OF STROKE: Status: RESOLVED | Noted: 2025-03-13 | Resolved: 2025-07-07

## 2025-07-07 PROBLEM — K75.81 NASH (NONALCOHOLIC STEATOHEPATITIS): Status: ACTIVE | Noted: 2025-07-07

## 2025-07-07 PROBLEM — G47.33 OSA (OBSTRUCTIVE SLEEP APNEA): Status: ACTIVE | Noted: 2025-07-07

## 2025-07-07 PROBLEM — E66.09 CLASS 1 OBESITY DUE TO EXCESS CALORIES WITH BODY MASS INDEX (BMI) OF 33.0 TO 33.9 IN ADULT: Status: ACTIVE | Noted: 2025-07-07

## 2025-07-07 PROBLEM — F17.211 CIGARETTE NICOTINE DEPENDENCE IN REMISSION: Status: ACTIVE | Noted: 2025-07-07

## 2025-07-07 PROBLEM — K51.90 ULCERATIVE COLITIS (HCC): Status: ACTIVE | Noted: 2025-07-07

## 2025-07-07 PROBLEM — E29.1 HYPOGONADISM IN MALE: Status: ACTIVE | Noted: 2025-07-07

## 2025-07-07 PROBLEM — Z96.643 HISTORY OF BILATERAL HIP REPLACEMENTS: Status: ACTIVE | Noted: 2025-07-07

## 2025-07-07 PROBLEM — I82.4Y2 ACUTE DEEP VEIN THROMBOSIS (DVT) OF PROXIMAL VEIN OF LEFT LOWER EXTREMITY (HCC): Status: RESOLVED | Noted: 2021-04-06 | Resolved: 2025-07-07

## 2025-07-07 PROBLEM — D68.69 ACQUIRED HYPERCOAGULABLE STATE: Status: ACTIVE | Noted: 2025-07-07

## 2025-07-07 PROBLEM — I10 ESSENTIAL HYPERTENSION: Status: RESOLVED | Noted: 2025-03-13 | Resolved: 2025-07-07

## 2025-07-07 PROBLEM — E66.811 CLASS 1 OBESITY DUE TO EXCESS CALORIES WITH BODY MASS INDEX (BMI) OF 33.0 TO 33.9 IN ADULT: Status: ACTIVE | Noted: 2025-07-07

## 2025-07-08 RX ORDER — METOPROLOL SUCCINATE 100 MG/1
150 TABLET, EXTENDED RELEASE ORAL DAILY
Qty: 135 TABLET | Refills: 3 | OUTPATIENT
Start: 2025-07-08

## 2025-08-23 DIAGNOSIS — I10 UNCONTROLLED HYPERTENSION: ICD-10-CM

## 2025-08-25 RX ORDER — VALSARTAN AND HYDROCHLOROTHIAZIDE 160; 25 MG/1; MG/1
1 TABLET ORAL DAILY
Qty: 90 TABLET | Refills: 1 | Status: SHIPPED | OUTPATIENT
Start: 2025-08-25